# Patient Record
Sex: MALE | Race: WHITE | NOT HISPANIC OR LATINO
[De-identification: names, ages, dates, MRNs, and addresses within clinical notes are randomized per-mention and may not be internally consistent; named-entity substitution may affect disease eponyms.]

---
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
## 2019-05-10 ENCOUNTER — APPOINTMENT (OUTPATIENT)
Dept: FAMILY MEDICINE | Facility: CLINIC | Age: 19
End: 2019-05-10
Payer: COMMERCIAL

## 2019-05-10 VITALS
DIASTOLIC BLOOD PRESSURE: 70 MMHG | SYSTOLIC BLOOD PRESSURE: 115 MMHG | TEMPERATURE: 97.7 F | RESPIRATION RATE: 20 BRPM | HEART RATE: 64 BPM

## 2019-05-10 PROCEDURE — 99213 OFFICE O/P EST LOW 20 MIN: CPT

## 2019-05-10 NOTE — HISTORY OF PRESENT ILLNESS
[FreeTextEntry8] : Presents on acute basis - states has had severe sore throat for the past 4 days; not improving.  States congested; denies fever.

## 2019-05-10 NOTE — PHYSICAL EXAM
[Ill-Appearing] : ill-appearing [Normal Outer Ear/Nose] : the outer ears and nose were normal in appearance [Normal Nasal Mucosa] : the nasal mucosa was normal [Supple] : supple [No Accessory Muscle Use] : no accessory muscle use [No Respiratory Distress] : no respiratory distress  [Clear to Auscultation] : lungs were clear to auscultation bilaterally [Normal Rate] : normal rate  [Regular Rhythm] : with a regular rhythm [Normal S1, S2] : normal S1 and S2 [No Murmur] : no murmur heard [Soft] : abdomen soft [Non Tender] : non-tender [de-identified] : TMs and pharynx congested; pharynx markedly injected with tonsils erythematous, mildly indurated [Cervical Lymph Nodes Enlarged Anterior Bilaterally] : enlarged nodes bilaterally

## 2019-05-10 NOTE — ASSESSMENT
[FreeTextEntry1] : Tonsillitis - will order Doxycycline 100mg twice daily for 7 days; encourage fluids.

## 2019-05-31 ENCOUNTER — APPOINTMENT (OUTPATIENT)
Dept: FAMILY MEDICINE | Facility: CLINIC | Age: 19
End: 2019-05-31
Payer: COMMERCIAL

## 2019-05-31 VITALS
WEIGHT: 187 LBS | BODY MASS INDEX: 24.78 KG/M2 | HEIGHT: 73 IN | SYSTOLIC BLOOD PRESSURE: 118 MMHG | HEART RATE: 58 BPM | DIASTOLIC BLOOD PRESSURE: 70 MMHG | RESPIRATION RATE: 20 BRPM

## 2019-05-31 PROCEDURE — 99395 PREV VISIT EST AGE 18-39: CPT | Mod: 25

## 2019-05-31 PROCEDURE — 93000 ELECTROCARDIOGRAM COMPLETE: CPT | Mod: 59

## 2019-05-31 PROCEDURE — 36415 COLL VENOUS BLD VENIPUNCTURE: CPT

## 2019-05-31 NOTE — HISTORY OF PRESENT ILLNESS
[FreeTextEntry1] : Requests comprehensive exam [de-identified] : Presents for comprehensive exam.  States feeling well, exercising regularly.  States recovered from recent tonsillitis (see previous note).\par Denies breathing issues.

## 2019-05-31 NOTE — PHYSICAL EXAM
[No Acute Distress] : no acute distress [Well Nourished] : well nourished [Well Developed] : well developed [Well-Appearing] : well-appearing [Normal Sclera/Conjunctiva] : normal sclera/conjunctiva [PERRL] : pupils equal round and reactive to light [EOMI] : extraocular movements intact [Normal Outer Ear/Nose] : the outer ears and nose were normal in appearance [Normal Oropharynx] : the oropharynx was normal [Normal TMs] : both tympanic membranes were normal [Normal Nasal Mucosa] : the nasal mucosa was normal [No JVD] : no jugular venous distention [Supple] : supple [No Lymphadenopathy] : no lymphadenopathy [Thyroid Normal, No Nodules] : the thyroid was normal and there were no nodules present [No Respiratory Distress] : no respiratory distress  [Clear to Auscultation] : lungs were clear to auscultation bilaterally [No Accessory Muscle Use] : no accessory muscle use [Normal Rate] : normal rate  [Regular Rhythm] : with a regular rhythm [Normal S1, S2] : normal S1 and S2 [No Murmur] : no murmur heard [No Carotid Bruits] : no carotid bruits [No Abdominal Bruit] : a ~M bruit was not heard ~T in the abdomen [No Varicosities] : no varicosities [Pedal Pulses Present] : the pedal pulses are present [No Edema] : there was no peripheral edema [No Extremity Clubbing/Cyanosis] : no extremity clubbing/cyanosis [No Palpable Aorta] : no palpable aorta [Soft] : abdomen soft [Non Tender] : non-tender [Non-distended] : non-distended [No Masses] : no abdominal mass palpated [No HSM] : no HSM [Normal Bowel Sounds] : normal bowel sounds [No Hernias] : no hernias [Penis Abnormality] : normal circumcised penis [Testes Tenderness] : no tenderness of the testes [Testes Mass (___cm)] : there were no testicular masses [Normal Posterior Cervical Nodes] : no posterior cervical lymphadenopathy [Normal Femoral Nodes] : no femoral lymphadenopathy [Normal Anterior Cervical Nodes] : no anterior cervical lymphadenopathy [Normal Inguinal Nodes] : no inguinal lymphadenopathy [No CVA Tenderness] : no CVA  tenderness [No Spinal Tenderness] : no spinal tenderness [No Joint Swelling] : no joint swelling [Grossly Normal Strength/Tone] : grossly normal strength/tone [No Rash] : no rash [No Skin Lesions] : no skin lesions [Normal Gait] : normal gait [Coordination Grossly Intact] : coordination grossly intact [No Focal Deficits] : no focal deficits [Speech Grossly Normal] : speech grossly normal [Memory Grossly Normal] : memory grossly normal [Normal Affect] : the affect was normal [Alert and Oriented x3] : oriented to person, place, and time [Normal Mood] : the mood was normal [Normal Insight/Judgement] : insight and judgment were intact [FreeTextEntry1] : tonsils large, somewhat cryptic, without evidence of injection or exudate

## 2019-05-31 NOTE — ASSESSMENT
[FreeTextEntry1] : Comprehensive exam reveals patient to be hemodynamically stable with acceptable BP\par Asthma quiescent at present\par Lab profiles sent

## 2019-06-01 LAB
ALBUMIN SERPL ELPH-MCNC: 4.3 G/DL
ALP BLD-CCNC: 49 U/L
ALT SERPL-CCNC: 22 U/L
ANION GAP SERPL CALC-SCNC: 12 MMOL/L
APPEARANCE: CLEAR
AST SERPL-CCNC: 21 U/L
BASOPHILS # BLD AUTO: 0.04 K/UL
BASOPHILS NFR BLD AUTO: 0.6 %
BILIRUB SERPL-MCNC: 0.8 MG/DL
BILIRUBIN URINE: NEGATIVE
BLOOD URINE: NEGATIVE
BUN SERPL-MCNC: 13 MG/DL
CALCIUM SERPL-MCNC: 9.4 MG/DL
CHLORIDE SERPL-SCNC: 105 MMOL/L
CHOLEST SERPL-MCNC: 159 MG/DL
CHOLEST/HDLC SERPL: 3.8 RATIO
CO2 SERPL-SCNC: 25 MMOL/L
COLOR: YELLOW
CREAT SERPL-MCNC: 1.12 MG/DL
EOSINOPHIL # BLD AUTO: 0.14 K/UL
EOSINOPHIL NFR BLD AUTO: 2.2 %
ESTIMATED AVERAGE GLUCOSE: 105 MG/DL
GLUCOSE QUALITATIVE U: NEGATIVE
GLUCOSE SERPL-MCNC: 92 MG/DL
HBA1C MFR BLD HPLC: 5.3 %
HCT VFR BLD CALC: 45.9 %
HDLC SERPL-MCNC: 42 MG/DL
HGB BLD-MCNC: 15.1 G/DL
IMM GRANULOCYTES NFR BLD AUTO: 0.3 %
KETONES URINE: NEGATIVE
LDLC SERPL CALC-MCNC: 106 MG/DL
LEUKOCYTE ESTERASE URINE: NEGATIVE
LYMPHOCYTES # BLD AUTO: 3.17 K/UL
LYMPHOCYTES NFR BLD AUTO: 49.5 %
MAN DIFF?: NORMAL
MCHC RBC-ENTMCNC: 30 PG
MCHC RBC-ENTMCNC: 32.9 GM/DL
MCV RBC AUTO: 91.1 FL
MONOCYTES # BLD AUTO: 0.36 K/UL
MONOCYTES NFR BLD AUTO: 5.6 %
NEUTROPHILS # BLD AUTO: 2.67 K/UL
NEUTROPHILS NFR BLD AUTO: 41.8 %
NITRITE URINE: NEGATIVE
PH URINE: 6
PLATELET # BLD AUTO: 309 K/UL
POTASSIUM SERPL-SCNC: 4.2 MMOL/L
PROT SERPL-MCNC: 6.7 G/DL
PROTEIN URINE: NEGATIVE
RBC # BLD: 5.04 M/UL
RBC # FLD: 13.1 %
SODIUM SERPL-SCNC: 142 MMOL/L
SPECIFIC GRAVITY URINE: 1.03
TRIGL SERPL-MCNC: 57 MG/DL
UROBILINOGEN URINE: NORMAL
WBC # FLD AUTO: 6.4 K/UL

## 2019-10-18 ENCOUNTER — TRANSCRIPTION ENCOUNTER (OUTPATIENT)
Age: 19
End: 2019-10-18

## 2019-10-18 ENCOUNTER — EMERGENCY (EMERGENCY)
Facility: HOSPITAL | Age: 19
LOS: 1 days | Discharge: HOME | End: 2019-10-18
Attending: EMERGENCY MEDICINE | Admitting: EMERGENCY MEDICINE
Payer: COMMERCIAL

## 2019-10-18 VITALS
SYSTOLIC BLOOD PRESSURE: 116 MMHG | TEMPERATURE: 99 F | RESPIRATION RATE: 18 BRPM | HEART RATE: 82 BPM | OXYGEN SATURATION: 99 % | DIASTOLIC BLOOD PRESSURE: 58 MMHG

## 2019-10-18 VITALS
HEART RATE: 93 BPM | RESPIRATION RATE: 17 BRPM | TEMPERATURE: 99 F | DIASTOLIC BLOOD PRESSURE: 70 MMHG | WEIGHT: 179.9 LBS | HEIGHT: 74 IN | OXYGEN SATURATION: 99 % | SYSTOLIC BLOOD PRESSURE: 146 MMHG

## 2019-10-18 PROCEDURE — 99284 EMERGENCY DEPT VISIT MOD MDM: CPT

## 2019-10-18 PROCEDURE — 93005 ELECTROCARDIOGRAM TRACING: CPT

## 2019-10-18 PROCEDURE — 99284 EMERGENCY DEPT VISIT MOD MDM: CPT | Mod: 25

## 2019-10-18 PROCEDURE — 93010 ELECTROCARDIOGRAM REPORT: CPT

## 2019-10-18 PROCEDURE — 96365 THER/PROPH/DIAG IV INF INIT: CPT

## 2019-10-18 RX ORDER — KETOROLAC TROMETHAMINE 30 MG/ML
30 SYRINGE (ML) INJECTION ONCE
Refills: 0 | Status: DISCONTINUED | OUTPATIENT
Start: 2019-10-18 | End: 2019-10-18

## 2019-10-18 RX ORDER — CEFTRIAXONE 500 MG/1
1000 INJECTION, POWDER, FOR SOLUTION INTRAMUSCULAR; INTRAVENOUS ONCE
Refills: 0 | Status: COMPLETED | OUTPATIENT
Start: 2019-10-18 | End: 2019-10-18

## 2019-10-18 RX ORDER — CEFUROXIME AXETIL 250 MG
1 TABLET ORAL
Qty: 18 | Refills: 0
Start: 2019-10-18 | End: 2019-10-26

## 2019-10-18 RX ADMIN — Medication 30 MILLIGRAM(S): at 22:06

## 2019-10-18 RX ADMIN — CEFTRIAXONE 100 MILLIGRAM(S): 500 INJECTION, POWDER, FOR SOLUTION INTRAMUSCULAR; INTRAVENOUS at 21:50

## 2019-10-18 RX ADMIN — Medication 30 MILLIGRAM(S): at 21:51

## 2019-10-18 RX ADMIN — CEFTRIAXONE 1000 MILLIGRAM(S): 500 INJECTION, POWDER, FOR SOLUTION INTRAMUSCULAR; INTRAVENOUS at 22:20

## 2019-10-18 NOTE — ED PROVIDER NOTE - NSFOLLOWUPINSTRUCTIONS_ED_ALL_ED_FT
return not able to swallow, motrin 400mg every 6 hours, warm rinses to mouth start antibiotics tomorrow

## 2019-10-18 NOTE — ED PROVIDER NOTE - CLINICAL SUMMARY MEDICAL DECISION MAKING FREE TEXT BOX
pt with tonsilitis difficulty swallowing no trismus received im decadron in urgent care and had a near syncopal event pt is feeling better after iv fluids and toradol ekg NSR

## 2019-10-18 NOTE — ED PROVIDER NOTE - OBJECTIVE STATEMENT
19 year old M with a sore throat and fever for two days. Pt was seen at urgent care and after getting a shot of decadron had near syncope. Pt denies chest pain or hurting himself. Pt has complaints of difficulty swallowing for four days. Pt reportedly had a neg mono.

## 2019-10-18 NOTE — ED PROVIDER NOTE - CARE PLAN
Principal Discharge DX:	Tonsillitis Principal Discharge DX:	Tonsillitis  Secondary Diagnosis:	Syncope

## 2019-10-18 NOTE — ED ADULT TRIAGE NOTE - CHIEF COMPLAINT QUOTE
Pt presents to the ED with complaints of throat pain, pt went to an urgent care and had IM Decadron and had a vasovagal reaction so urgent care called an ambulance for the pt. EMS inserted IV to L AC with NS infusing at this time. Pt has been sick x1 week and tonsils are swollen.

## 2019-10-18 NOTE — ED ADULT NURSE NOTE - OBJECTIVE STATEMENT
Pt presents to the ED with complaints of sore throat, pt went to urgent care due to throat pain and was dx with tonsilitis. Pt was given 10mg of Decadron IM at urgent care and then had a vasovagal episode so they called EMS. Pt states he has been sick for a week and has had said throat pain. Pt has no pertinent past medical history, airway patent but pt complains of increased pain swallowing and thus  states he cannot swallow due to pain. EMS inserted IV with NS infusing upon arrival. Pt denies SOB or chest pain.

## 2019-10-18 NOTE — ED PROVIDER NOTE - PATIENT PORTAL LINK FT
You can access the FollowMyHealth Patient Portal offered by [facility] by registering at the following website: [email or web address]. By joining [employer]’s FollowMyHealth portal, you will also be able to view your health information using other applications (apps) compatible with our system.

## 2019-11-27 ENCOUNTER — APPOINTMENT (OUTPATIENT)
Dept: FAMILY MEDICINE | Facility: CLINIC | Age: 19
End: 2019-11-27
Payer: COMMERCIAL

## 2019-11-27 VITALS — RESPIRATION RATE: 20 BRPM | SYSTOLIC BLOOD PRESSURE: 120 MMHG | DIASTOLIC BLOOD PRESSURE: 70 MMHG | HEART RATE: 64 BPM

## 2019-11-27 DIAGNOSIS — J35.8 OTHER CHRONIC DISEASES OF TONSILS AND ADENOIDS: ICD-10-CM

## 2019-11-27 PROCEDURE — 99213 OFFICE O/P EST LOW 20 MIN: CPT

## 2019-11-27 NOTE — HISTORY OF PRESENT ILLNESS
[FreeTextEntry8] : Presents on acute basis over concern about "tonsils."  States tonsils are enlarged and have been a source of discomfort; has gotten significant tonsillitis on multiple occasions and is questioning whether he is a candidate for tonsillectomy.  States "feels fine" at present.

## 2019-11-27 NOTE — ASSESSMENT
[FreeTextEntry1] : Cryptic tonsils without evidence of acute infection at present - discussed the criteria for tonsillectomy, the fact that "adult tonsils" are somewhat more of a challenge; feel prudent to have pt see ENT - request for referral placed and staff to assist.

## 2019-11-27 NOTE — PHYSICAL EXAM
[No Acute Distress] : no acute distress [Normal Outer Ear/Nose] : the outer ears and nose were normal in appearance [Normal TMs] : both tympanic membranes were normal [Normal Nasal Mucosa] : the nasal mucosa was normal [Supple] : supple [Normal] : normal rate, regular rhythm, normal S1 and S2 and no murmur heard [Soft] : abdomen soft [Non Tender] : non-tender [Normal Posterior Cervical Nodes] : no posterior cervical lymphadenopathy [Normal Anterior Cervical Nodes] : no anterior cervical lymphadenopathy [de-identified] : mild congestion without injection; bilateral hypertrophic tonsils, somewhat cryptic; no exudate or injection noted

## 2020-03-07 ENCOUNTER — TRANSCRIPTION ENCOUNTER (OUTPATIENT)
Age: 20
End: 2020-03-07

## 2020-03-12 ENCOUNTER — TRANSCRIPTION ENCOUNTER (OUTPATIENT)
Age: 20
End: 2020-03-12

## 2020-03-30 ENCOUNTER — APPOINTMENT (OUTPATIENT)
Dept: FAMILY MEDICINE | Facility: CLINIC | Age: 20
End: 2020-03-30
Payer: COMMERCIAL

## 2020-03-30 VITALS
HEART RATE: 68 BPM | RESPIRATION RATE: 20 BRPM | TEMPERATURE: 97.8 F | DIASTOLIC BLOOD PRESSURE: 70 MMHG | SYSTOLIC BLOOD PRESSURE: 122 MMHG

## 2020-03-30 PROCEDURE — 99213 OFFICE O/P EST LOW 20 MIN: CPT

## 2020-03-30 NOTE — ASSESSMENT
[FreeTextEntry1] : Tonsillitis - will order Augmentin 500mg twice daily for 7 days (pt states has used this with good results from "Urgent Care" in the past); advise fluids, rest.

## 2020-03-30 NOTE — REVIEW OF SYSTEMS
[Fever] : no fever [Chills] : no chills [Earache] : no earache [Nasal Discharge] : no nasal discharge [Sore Throat] : sore throat [Negative] : Genitourinary

## 2020-03-30 NOTE — PHYSICAL EXAM
[No Acute Distress] : no acute distress [Normal Outer Ear/Nose] : the outer ears and nose were normal in appearance [Normal TMs] : both tympanic membranes were normal [Normal Nasal Mucosa] : the nasal mucosa was normal [Supple] : supple [Normal] : normal rate, regular rhythm, normal S1 and S2 and no murmur heard [Soft] : abdomen soft [Non Tender] : non-tender [Cervical Lymph Nodes Enlarged Anterior Bilaterally] : enlarged nodes bilaterally [Tender] : tender [de-identified] : tonsils markedly inflamed, erythematous

## 2020-03-30 NOTE — HISTORY OF PRESENT ILLNESS
[FreeTextEntry8] : Patient requested an acute visit - awoke this morning with "swollen tonsils;" states painful; denies temp or other symptoms.  Note this has been a recurrent issue.

## 2020-06-01 ENCOUNTER — APPOINTMENT (OUTPATIENT)
Dept: FAMILY MEDICINE | Facility: CLINIC | Age: 20
End: 2020-06-01
Payer: COMMERCIAL

## 2020-06-01 VITALS
SYSTOLIC BLOOD PRESSURE: 114 MMHG | HEIGHT: 73 IN | WEIGHT: 213 LBS | BODY MASS INDEX: 28.23 KG/M2 | DIASTOLIC BLOOD PRESSURE: 70 MMHG | HEART RATE: 60 BPM | RESPIRATION RATE: 20 BRPM

## 2020-06-01 PROCEDURE — 99395 PREV VISIT EST AGE 18-39: CPT | Mod: 25

## 2020-06-01 PROCEDURE — 93000 ELECTROCARDIOGRAM COMPLETE: CPT

## 2020-06-01 PROCEDURE — 36415 COLL VENOUS BLD VENIPUNCTURE: CPT

## 2020-06-01 NOTE — ASSESSMENT
[FreeTextEntry1] : Comprehensive exam reveals patient to be hemodynamically stable with acceptable BP\par Asthma quiescent at present\par Tonsil findings consistent with history - await tonsillectomy\par Lab profiles drawn in office and sent none

## 2020-06-01 NOTE — HISTORY OF PRESENT ILLNESS
[FreeTextEntry1] : Requests comprehensive exam [de-identified] : Presents for comprehensive exam.  States feeling well; trying to exercise as much as possible since the gyms have been closed.  Denies new breathing issues. Reviewed screening and immunizations - offered HIV screening per guidelines - pt is amenable.\par Also needs forms for college transfer (see scanned documents).\par Also note scheduled for tonsillectomy in one week ([place]).

## 2020-06-01 NOTE — PHYSICAL EXAM
[Normal TMs] : both tympanic membranes were normal [Normal Outer Ear/Nose] : the outer ears and nose were normal in appearance [Normal Nasal Mucosa] : the nasal mucosa was normal [Normal Posterior Cervical Nodes] : no posterior cervical lymphadenopathy [Normal Anterior Cervical Nodes] : no anterior cervical lymphadenopathy [Normal Inguinal Nodes] : no inguinal lymphadenopathy [Normal Femoral Nodes] : no femoral lymphadenopathy [Coordination Grossly Intact] : coordination grossly intact [Normal] : no joint swelling and grossly normal strength and tone [Normal Gait] : normal gait [Speech Grossly Normal] : speech grossly normal [No Focal Deficits] : no focal deficits [Alert and Oriented x3] : oriented to person, place, and time [Normal Affect] : the affect was normal [Memory Grossly Normal] : memory grossly normal [Normal Insight/Judgement] : insight and judgment were intact [Normal Mood] : the mood was normal [de-identified] : cryptic tonsils noted

## 2020-06-01 NOTE — HEALTH RISK ASSESSMENT
[Yes] : Yes [2 - 4 times a month (2 pts)] : 2-4 times a month (2 points) [1 or 2 (0 pts)] : 1 or 2 (0 points) [Never (0 pts)] : Never (0 points) [No] : In the past 12 months have you used drugs other than those required for medical reasons? No [0] : 2) Feeling down, depressed, or hopeless: Not at all (0) [] : No [Audit-CScore] : 2

## 2020-06-01 NOTE — COUNSELING
[de-identified] : Healthy eating and activities [None] : None [Good understanding] : Patient has a good understanding of lifestyle changes and steps needed to achieve self management goal

## 2020-06-02 LAB
ALBUMIN SERPL ELPH-MCNC: 4.9 G/DL
ALP BLD-CCNC: 45 U/L
ALT SERPL-CCNC: 21 U/L
ANION GAP SERPL CALC-SCNC: 13 MMOL/L
APPEARANCE: CLEAR
AST SERPL-CCNC: 17 U/L
BASOPHILS # BLD AUTO: 0.05 K/UL
BASOPHILS NFR BLD AUTO: 0.7 %
BILIRUB SERPL-MCNC: 0.7 MG/DL
BILIRUBIN URINE: NEGATIVE
BLOOD URINE: NEGATIVE
BUN SERPL-MCNC: 14 MG/DL
CALCIUM SERPL-MCNC: 10.4 MG/DL
CHLORIDE SERPL-SCNC: 103 MMOL/L
CHOLEST SERPL-MCNC: 217 MG/DL
CHOLEST/HDLC SERPL: 4.7 RATIO
CO2 SERPL-SCNC: 24 MMOL/L
COLOR: YELLOW
CREAT SERPL-MCNC: 1.11 MG/DL
EOSINOPHIL # BLD AUTO: 0.11 K/UL
EOSINOPHIL NFR BLD AUTO: 1.6 %
GLUCOSE QUALITATIVE U: NEGATIVE
GLUCOSE SERPL-MCNC: 90 MG/DL
HCT VFR BLD CALC: 47.2 %
HDLC SERPL-MCNC: 46 MG/DL
HGB BLD-MCNC: 15.8 G/DL
HIV1+2 AB SPEC QL IA.RAPID: NONREACTIVE
IMM GRANULOCYTES NFR BLD AUTO: 0.1 %
KETONES URINE: NEGATIVE
LDLC SERPL CALC-MCNC: 140 MG/DL
LEUKOCYTE ESTERASE URINE: NEGATIVE
LYMPHOCYTES # BLD AUTO: 2.99 K/UL
LYMPHOCYTES NFR BLD AUTO: 44.2 %
MAN DIFF?: NORMAL
MCHC RBC-ENTMCNC: 29.8 PG
MCHC RBC-ENTMCNC: 33.5 GM/DL
MCV RBC AUTO: 88.9 FL
MONOCYTES # BLD AUTO: 0.46 K/UL
MONOCYTES NFR BLD AUTO: 6.8 %
NEUTROPHILS # BLD AUTO: 3.14 K/UL
NEUTROPHILS NFR BLD AUTO: 46.6 %
NITRITE URINE: NEGATIVE
PH URINE: 5.5
PLATELET # BLD AUTO: 295 K/UL
POTASSIUM SERPL-SCNC: 4.1 MMOL/L
PROT SERPL-MCNC: 7 G/DL
PROTEIN URINE: NEGATIVE
RBC # BLD: 5.31 M/UL
RBC # FLD: 12.7 %
SODIUM SERPL-SCNC: 140 MMOL/L
SPECIFIC GRAVITY URINE: 1.02
TRIGL SERPL-MCNC: 153 MG/DL
UROBILINOGEN URINE: NORMAL
WBC # FLD AUTO: 6.76 K/UL

## 2020-07-18 ENCOUNTER — TRANSCRIPTION ENCOUNTER (OUTPATIENT)
Age: 20
End: 2020-07-18

## 2020-08-21 NOTE — ED ADULT TRIAGE NOTE - HEIGHT IN INCHES
Physician Documentation                                                                           

 [facility]                                                                 

Name: Brandon Chaudhari                                                                              

Age: 29 yrs                                                                                       

Sex: Male                                                                                         

: 1990                                                                                   

MRN: [id number]                                                                                   

Arrival Date: 2020                                                                          

Time: 20:31                                                                                       

Account#: [id number]                                                                            

Bed 5                                                                                             

Private MD:                                                                                       

ED Physician Denver Jeffersno                                                                      

HPI:                                                                                              

                                                                                             

21:28 This 29 yrs old Male presents to ER via Ambulatory with complaints of Numbness,         mh7 

      Abdominal Pain, Chest Pressure.                                                             

21:28 The patient or guardian reports chest pain that is located primarily in the epigastric  mh7 

      area.                                                                                       

21:30 The patient or guardian reports chest pain that is located primarily in the anterior    mh7 

      chest wall, bilaterally. The pain does not radiate.                                         

21:32 Associated signs and symptoms: Pertinent positives: abdominal pain, palpitations,       mh7 

      numbness/tingling of hands, Pertinent negatives: cough, diaphoresis, dizziness,             

      headache, lower extremity pain, lower extremity swelling, lightheadedness, nausea, near     

      syncope, recent travel, shortness of breath, syncope, vomiting. The chest pain is           

      described as a pressure. Duration: The patient or guardian reports multiple episodes,       

      that are intermittent, that wax and wane, with no pattern. Modifying factors: The           

      symptoms are alleviated by nothing. the symptoms are aggravated by emotionally              

      stressful situations. Severity of pain: At its worst the pain was moderate today, in        

      the emergency department the pain has improved markedly. The patient has experienced        

      similar episodes in the past, multiple times. Patient reports a history of anxiety as a     

      child. He states similar symptoms reoccurred starting about 6 weeks ago intermittently.     

      He reports having two episodes today. Symptoms include chest pressure,                      

      numbness/tingling of hands, upper abdominal cramping..                                      

                                                                                                  

Historical:                                                                                       

- Allergies:                                                                                      

20:49 Morphine;                                                                               jd3 

- Home Meds:                                                                                      

20:49 None [Active];                                                                          jd3 

- PMHx:                                                                                           

20:49 Anxiety; PVC's; ADD/ADHD;                                                               jd3 

- PSHx:                                                                                           

20:49 FELICE hand; left ankle;                                                                   jd3 

                                                                                                  

- Immunization history:: Adult Immunizations up to date.                                          

- Social history:: Smoking status: Patient/guardian denies using tobacco, Stopped _               

  months ago 1.                                                                                   

                                                                                                  

                                                                                                  

ROS:                                                                                              

21:32 Constitutional: Negative for fever, chills, and weight loss, Eyes: Negative for injury, mh7 

      pain, redness, and discharge, ENT: Negative for injury, pain, and discharge, Neck:          

      Negative for injury, pain, and swelling, Respiratory: Negative for shortness of breath,     

      cough, wheezing, and pleuritic chest pain, Back: Negative for injury and pain, :          

      Negative for injury, bleeding, discharge, and swelling, Skin: Negative for injury,          

      rash, and discoloration, Allergy/Immunology: Negative for hives, rash, and allergies,       

      Endocrine: Negative for neck swelling, polydipsia, polyuria, polyphagia, and marked         

      weight changes, Hematologic/Lymphatic: Negative for swollen nodes, abnormal bleeding,       

      and unusual bruising.                                                                       

                                                                                                  

Exam:                                                                                             

21:32 Head/Face:  Normocephalic, atraumatic. Eyes:  Pupils equal round and reactive to light, mh7 

      extra-ocular motions intact.  Lids and lashes normal.  Conjunctiva and sclera are           

      non-icteric and not injected.  Cornea within normal limits.  Periorbital areas with no      

      swelling, redness, or edema. ENT:  Nares patent. No nasal discharge, no septal              

      abnormalities noted.  Tympanic membranes are normal and external auditory canals are        

      clear.  Oropharynx with no redness, swelling, or masses, exudates, or evidence of           

      obstruction, uvula midline.  Mucous membranes moist. Neck:  Trachea midline, no             

      thyromegaly or masses palpated, and no cervical lymphadenopathy.  Supple, full range of     

      motion without nuchal rigidity, or vertebral point tenderness.  No Meningismus.             

      Chest/axilla:  Normal chest wall appearance and motion.  Nontender with no deformity.       

      No lesions are appreciated. Cardiovascular:  Regular rate and rhythm with a normal S1       

      and S2.  No gallops, murmurs, or rubs.  Normal PMI, no JVD.  No pulse deficits.             

      Respiratory:  Lungs have equal breath sounds bilaterally, clear to auscultation and         

      percussion.  No rales, rhonchi or wheezes noted.  No increased work of breathing, no        

      retractions or nasal flaring. Abdomen/GI:  Soft, non-tender, with normal bowel sounds.      

      No distension or tympany.  No guarding or rebound.  No evidence of tenderness               

      throughout. Back:  No spinal tenderness.  No costovertebral tenderness.  Full range of      

      motion. Skin:  Warm, dry with normal turgor.  Normal color with no rashes, no lesions,      

      and no evidence of cellulitis. MS/ Extremity:  Pulses equal, no cyanosis.                   

      Neurovascular intact.  Full, normal range of motion. Neuro:  Awake and alert, GCS 15,       

      oriented to person, place, time, and situation.  Cranial nerves II-XII grossly intact.      

      Motor strength 5/5 in all extremities.  Sensory grossly intact.  Cerebellar exam            

      normal.  Normal gait.                                                                       

21:32 Constitutional: The patient appears in no acute distress, alert, awake, comfortable,        

      anxious.                                                                                    

21:32 Psych: Behavior/mood is pleasant, cooperative, anxious, Affect is calm, Oriented to         

      person, place, time, Patient has no thoughts/intents to harm self or others. Judgement      

      / Insight is normal. Memory is normal. Delusions/hallucinations are not present.            

                                                                                                  

Vital Signs:                                                                                      

20:46  / 70; Pulse 55; Resp 19 S; Temp 98.6(O); Pulse Ox 100% on R/A; Weight 79.38 kg   jd3 

      (R); Height 6 ft. 1 in. (185.42 cm) (R); Pain 5/10;                                         

21:55  / 75; Pulse 52; Resp 17; Pulse Ox 98% ;                                          rr5 

22:30  / 63; Pulse 55; Resp 19; Pulse Ox 100% ;                                         rr5 

23:25  / 69; Pulse 53; Resp 14; Pulse Ox 98% ;                                          rr5 

23:52  / 70; Pulse 55; Resp 16; Pulse Ox 99% ;                                          rr5 

20:46 Body Mass Index 23.09 (79.38 kg, 185.42 cm)                                             jd3 

                                                                                                  

MDM:                                                                                              

20:54 Patient medically screened.                                                             [facility] 

23:38 Differential diagnosis: acute myocardial infarction, anxiety, coronary artery disease   [facility] 

      chest wall pain, costochondritis, peptic ulcer disease, pneumonia, pneumothorax. HEART      

      Score: History: Slightly Suspicious (0), ECG: Normal (0), Age: < or = 45 years (0),         

      Risk Factors: No Risk Factors Known (0), Troponin: < or = 1 x Normal Limit (0), Total       

      Score = 0. Data reviewed: vital signs, nurses notes, lab test result(s), cardiac            

      enzymes, CBC, electrolytes, urinalysis, urine drug screen, EKG, radiologic studies,         

      plain films. Data interpreted: Cardiac monitor: rate is 98 beats/min, rhythm is normal      

      sinus rhythm, regular, Pulse oximetry: on room air is 98 %. Interpretation: normal.         

      Counseling: I had a detailed discussion with the patient and/or guardian regarding: the     

      historical points, exam findings, and any diagnostic results supporting the                 

      discharge/admit diagnosis, lab results, radiology results, the need for outpatient          

      follow up, to return to the emergency department if symptoms worsen or persist or if        

      there are any questions or concerns that arise at home.                                     

                                                                                                  

                                                                                             

20:57 Order name: Basic Metabolic Panel; Complete Time: 22:04                                 7 

                                                                                             

20:57 Order name: CBC with Diff; Complete Time: 22:04                                         [facility] 

                                                                                             

20:57 Order name: LFT's; Complete Time: 22:04                                                 [facility] 

                                                                                             

20:57 Order name: Magnesium; Complete Time: 22:04                                             7 

                                                                                             

20:57 Order name: Troponin (emerg Dept Use Only); Complete Time: 22:04                        [facility] 

                                                                                             

20:57 Order name: Lipase; Complete Time: 22:04                                                7 

                                                                                             

20:57 Order name: XRAY Chest (1 view)                                                         [facility] 

                                                                                             

20:57 Order name: EKG; Complete Time: 20:57                                                   7 

                                                                                             

20:57 Order name: Cardiac monitoring; Complete Time: 20:58                                    7 

                                                                                             

20:57 Order name: EKG - Nurse/Tech; Complete Time: 21:12                                      7 

                                                                                             

20:57 Order name: IV Saline Lock; Complete Time: 21:12                                        7 

                                                                                             

20:57 Order name: Labs collected and sent; Complete Time: 21:12                               7 

                                                                                             

20:57 Order name: UDS; Complete Time: 22:04                                                   7 

                                                                                             

21:11 Order name: Urine Dipstick--Ancillary (enter results); Complete Time: 22:04             tt3 

                                                                                             

20:57 Order name: O2 Per Protocol; Complete Time: 20:58                                       7 

                                                                                             

20:57 Order name: O2 Sat Monitoring; Complete Time: 20:58                                     7 

                                                                                                  

Administered Medications:                                                                         

No medications were administered                                                                  

                                                                                                  

                                                                                                  

Disposition:                                                                                      

                                                                                             

07:22 Co-signature as Attending Physician, Denver Jefferson MD.                                 [facility] 

                                                                                                  

Disposition:                                                                                      

20 23:42 Discharged to Home. Impression: Chest pain, unspecified, Anxiety.                  

- Condition is Stable.                                                                            

- Discharge Instructions: Nonspecific Chest Pain, Easy-to-Read, Generalized Anxiety               

  Disorder.                                                                                       

                                                                                                  

- Medication Reconciliation Form, Thank You Letter, Antibiotic Education, Prescription            

  Opioid Use form.                                                                                

- Follow up: Private Physician; When: 2 - 3 days; Reason: Worsening of condition,                 

  Recheck today's complaints, Continuance of care, Re-evaluation by your physician.               

- Problem is an ongoing problem.                                                                  

- Symptoms have improved.                                                                         

                                                                                                  

                                                                                                  

                                                                                                  

Signatures:                                                                                       

Dispatcher [employer]                           EDJewel Nunez RN                    RN   jd3                                                  

Wander Beckham RN                      RN   rr5                                                  

Denver Jefferson MD MD   mh7                                                  

                                                                                                  

Corrections: (The following items were deleted from the chart)                                    

                                                                                             

23:55 23:42 2020 23:42 Discharged to Home. Impression: Chest pain, unspecified;         rr5 

      Anxiety. Condition is Stable. Forms are Medication Reconciliation Form, Thank You           

      Letter, Antibiotic Education, Prescription Opioid Use. Follow up: Private Physician;        

      When: 2 - 3 days; Reason: Worsening of condition, Recheck today's complaints,               

      Continuance of care, Re-evaluation by your physician. Problem is an ongoing problem.        

      Symptoms have improved. mh7                                                                 

                                                                                                  

**************************************************************************************************
2

## 2021-01-30 ENCOUNTER — EMERGENCY (EMERGENCY)
Facility: HOSPITAL | Age: 21
LOS: 1 days | Discharge: HOME | End: 2021-01-30
Attending: EMERGENCY MEDICINE | Admitting: EMERGENCY MEDICINE
Payer: COMMERCIAL

## 2021-01-30 VITALS
RESPIRATION RATE: 18 BRPM | HEIGHT: 74 IN | TEMPERATURE: 98 F | WEIGHT: 214.95 LBS | SYSTOLIC BLOOD PRESSURE: 132 MMHG | DIASTOLIC BLOOD PRESSURE: 79 MMHG | OXYGEN SATURATION: 98 % | HEART RATE: 101 BPM

## 2021-01-30 PROCEDURE — 23650 CLTX SHO DSLC W/MNPJ WO ANES: CPT | Mod: 54

## 2021-01-30 PROCEDURE — 99284 EMERGENCY DEPT VISIT MOD MDM: CPT | Mod: 57

## 2021-01-30 PROCEDURE — 73030 X-RAY EXAM OF SHOULDER: CPT | Mod: 26,RT

## 2021-01-30 NOTE — ED ADULT NURSE NOTE - NSFALLRSKASSESSDT_ED_ALL_ED
What Type Of Note Output Would You Prefer (Optional)?: Bullet Format
Hpi Title: Evaluation of Skin Lesions
How Severe Are Your Spot(S)?: moderate
Location: Right forearm
Year Removed: 2019
Additional History: Declines chaperone
30-Jan-2021 23:42

## 2021-01-31 VITALS
DIASTOLIC BLOOD PRESSURE: 82 MMHG | TEMPERATURE: 98 F | OXYGEN SATURATION: 89 % | RESPIRATION RATE: 17 BRPM | HEART RATE: 96 BPM | SYSTOLIC BLOOD PRESSURE: 127 MMHG

## 2021-01-31 PROCEDURE — 73030 X-RAY EXAM OF SHOULDER: CPT | Mod: 26,RT

## 2021-01-31 PROCEDURE — 99283 EMERGENCY DEPT VISIT LOW MDM: CPT | Mod: 25

## 2021-01-31 PROCEDURE — 73030 X-RAY EXAM OF SHOULDER: CPT

## 2021-01-31 PROCEDURE — 23650 CLTX SHO DSLC W/MNPJ WO ANES: CPT | Mod: RT

## 2021-01-31 RX ORDER — MORPHINE SULFATE 50 MG/1
4 CAPSULE, EXTENDED RELEASE ORAL ONCE
Refills: 0 | Status: DISCONTINUED | OUTPATIENT
Start: 2021-01-31 | End: 2021-01-31

## 2021-01-31 RX ORDER — ONDANSETRON 8 MG/1
4 TABLET, FILM COATED ORAL ONCE
Refills: 0 | Status: COMPLETED | OUTPATIENT
Start: 2021-01-31 | End: 2021-01-31

## 2021-01-31 RX ADMIN — MORPHINE SULFATE 4 MILLIGRAM(S): 50 CAPSULE, EXTENDED RELEASE ORAL at 00:13

## 2021-01-31 RX ADMIN — ONDANSETRON 4 MILLIGRAM(S): 8 TABLET, FILM COATED ORAL at 00:08

## 2021-01-31 NOTE — ED PROVIDER NOTE - OBJECTIVE STATEMENT
21 y/o male with no sig PMHx presents to ED c/o sudden onset right shoulder pain s/p injury while race ling. pain increased on moving right arm

## 2021-01-31 NOTE — ED PROVIDER NOTE - CLINICAL SUMMARY MEDICAL DECISION MAKING FREE TEXT BOX
pt p/w right shoulder pain after injury while race ling ant dislocation on xray, close reduction done with Davos technique,  post reduction Xray reviewed. joint successfully reduced

## 2021-01-31 NOTE — ED PROVIDER NOTE - PHYSICAL EXAMINATION
General:     NAD, well-nourished, well-appearing  Head:     NC/AT, EOMI, oral mucosa moist  Neck:     supple  Lungs:     CTA b/l, no w/r/r  CVS:     S1S2, RRR, no m/g/r  Abd:     +BS, s/nt/nd, no organomegaly  Ext:    2+ radial and pedal pulses, no c/c/e, right shoulder decreased ROM, distal NV intact  Neuro: grossly intact

## 2021-01-31 NOTE — ED PROVIDER NOTE - CARE PROVIDER_API CALL
Forrest Webster)  Orthopaedic Sports Medicine; Orthopaedic Surgery  [address] [address]  Phone: [phone]  Fax: [phone]  Follow Up Time:

## 2021-01-31 NOTE — ED PROVIDER NOTE - PATIENT PORTAL LINK FT
You can access the FollowMyHealth Patient Portal offered by [facility] by registering at the following website: [email or web address]. By joining [facility]’s FollowMyHealth portal, you will also be able to view your health information using other applications (apps) compatible with our system.

## 2021-02-05 ENCOUNTER — APPOINTMENT (OUTPATIENT)
Age: 21
End: 2021-02-05
Payer: COMMERCIAL

## 2021-02-05 VITALS — WEIGHT: 215 LBS | BODY MASS INDEX: 27.59 KG/M2 | HEIGHT: 74 IN

## 2021-02-05 VITALS — SYSTOLIC BLOOD PRESSURE: 118 MMHG | HEART RATE: 99 BPM | DIASTOLIC BLOOD PRESSURE: 78 MMHG

## 2021-02-05 PROCEDURE — 99203 OFFICE O/P NEW LOW 30 MIN: CPT

## 2021-02-05 PROCEDURE — 99072 ADDL SUPL MATRL&STAF TM PHE: CPT

## 2021-02-05 NOTE — DISCUSSION/SUMMARY
[de-identified] : I discussed the underlying pathophysiology of the patient's condition in great detail with the patient. I went over the patient's x-rays with them in great detail. We discussed the use of ice, Tylenol and anti-inflammatories to relieve pain. At this time, he should avoid any heavy lifting, carrying, or overhead activities. He should avoid moving his arm into abduction and external rotation to prevent another dislocation. At this time, he will start a course of physical therapy for strengthening and flexibility. A prescription for physical therapy was provided. I informed him that shoulder stabilization surgery is considered after 3 dislocations. All of his questions were answered. He understands and consents to the plan.\par \par FU 1 month.\par after undergoing PT for his right shoulder.

## 2021-02-05 NOTE — ADDENDUM
[FreeTextEntry1] : I, Rene Peterson, acted solely as a scribe for Dr. Devon Hoang on this date 02/05/2021.\par All medical record entries made by the Scribe were at my, Dr. Devon Hoang, direction and personally dictated by me on 02/05/2021. I have reviewed the chart and agree that the record accurately reflects my personal performance of the history, physical exam, assessment and plan. I have also personally directed, reviewed, and agreed with the chart.

## 2021-02-05 NOTE — PHYSICAL EXAM
[de-identified] : Constitutional\par o Appearance : well-nourished, well developed, alert, in no acute distress \par Head and Face\par o Head :\par ¦ Inspection : atraumatic, normocephalic\par o Face :\par ¦ Inspection : no visible rash or discoloration\par Respiratory\par o Respiratory Effort: breathing unlabored \par Neurologic\par o Sensation : Normal sensation \par Psychiatric\par o Mood and Affect: mood normal, affect appropriate \par Lymphatic\par o Additional Nodes : No palpable lymph nodes present \par \par Right Upper Extremity\par o Right Shoulder :\par ¦ Inspection/Palpation : mild anterior capsular tenderness, no AC joint tenderness, no swelling or deformities, normal sensation in the axillary patch\par ¦ Range of Motion : full and painless in all planes, no crepitance, mild apprehension with abduction and external rotation\par ¦ Strength :  forward elevation, internal rotation, external rotation, external rotation at 90° of abduction, supraspinatus, adduction and abduction, biceps/triceps, 5/5, excellent  strength\par ¦ Stability : no joint instability on provocative testing \par Tests: Box negative, Neer test negative, Justin test negative, drop arm test negative\par \par Gait and Station:\par Gait: gait normal, no significant extremity swelling or lymphedema, good proprioception and balance

## 2021-02-05 NOTE — HISTORY OF PRESENT ILLNESS
[de-identified] : 20 year old RHD male presents complaining of right shoulder pain. It started on 1/30/2021 when he fell onto his right hand with his arm outstretched. He went to [facility] and was found to have a dislocated shoulder. It was reduced in the hospital a few hours later and he was placed in a sling. He denies any numbness and tingling into his hand before or after the reduction. He denies any previous shoulder dislocations.\par \par X-rays of the RIGHT shoulder obtained at [facility] on 1/30/2021 revealed:\par o AP and attempted Y views revealed an anterior inferior dislocation of the right shoulder. Post-reduction views showed a reduced shoulder without any evident fracture.

## 2021-02-05 NOTE — CONSULT LETTER
[Dear  ___] : Dear  [unfilled], [Consult Letter:] : I had the pleasure of evaluating your patient, [unfilled]. [Please see my note below.] : Please see my note below. [Consult Closing:] : Thank you very much for allowing me to participate in the care of this patient.  If you have any questions, please do not hesitate to contact me. [Sincerely,] : Sincerely, [FreeTextEntry3] : Devon Hoang M.D.

## 2021-03-05 ENCOUNTER — APPOINTMENT (OUTPATIENT)
Dept: ORTHOPEDIC SURGERY | Facility: CLINIC | Age: 21
End: 2021-03-05
Payer: COMMERCIAL

## 2021-03-05 PROCEDURE — 99072 ADDL SUPL MATRL&STAF TM PHE: CPT

## 2021-03-05 PROCEDURE — 99213 OFFICE O/P EST LOW 20 MIN: CPT

## 2021-03-05 NOTE — ADDENDUM
[FreeTextEntry1] : I, Rene Peterson, acted solely as a scribe for Dr. Devon Hoang on this date 03/05/2021.\par All medical record entries made by the Scribe were at my, Dr. Devon Hoang, direction and personally dictated by me on 03/05/2021. I have reviewed the chart and agree that the record accurately reflects my personal performance of the history, physical exam, assessment and plan. I have also personally directed, reviewed, and agreed with the chart.

## 2021-03-05 NOTE — DISCUSSION/SUMMARY
[de-identified] : I went over the pathophysiology of the patient's symptoms in great detail with the patient. At-home strengthening, and stretching exercises were discussed and demonstrated with the patient. He should focus on light weight and high repetition exercises. He was advised to avoid positions involving abduction and external rotation to prevent another dislocation. I am recommending the patient continue going to physical therapy to obtain increases in their strength and mobility. A prescription for PT was provided. All of his questions were answered. He understands and consents to the plan. \par \par FU 6 weeks.\par after continuing PT for his right shoulder.

## 2021-03-05 NOTE — HISTORY OF PRESENT ILLNESS
[de-identified] : 21 year old RHD male presents with right shoulder pain. It started on 1/30/2021 when he fell onto his right hand with his arm outstretched. He went to [facility] and was found to have a dislocated shoulder. It was reduced in the hospital a few hours later. He denies any numbness and tingling into his hand before or after the reduction. He denies any previous shoulder dislocations. He has been attending PT for his shoulder and notes significant improvements in his strength and ROM. He denies pain when sleeping at night. He notes that he lifts weights every day.\par \par X-rays of the RIGHT shoulder obtained at [facility] on 1/30/2021 revealed:\par o AP and attempted Y views revealed an anterior inferior dislocation of the right shoulder. Post-reduction views showed a reduced shoulder without any evident fracture.

## 2021-03-05 NOTE — PHYSICAL EXAM
[de-identified] : Constitutional\par o Appearance : well-nourished, well developed, alert, in no acute distress \par Head and Face\par o Head :\par ¦ Inspection : atraumatic, normocephalic\par o Face :\par ¦ Inspection : no visible rash or discoloration\par Respiratory\par o Respiratory Effort: breathing unlabored \par Neurologic\par o Sensation : Normal sensation \par Psychiatric\par o Mood and Affect: mood normal, affect appropriate \par Lymphatic\par o Additional Nodes : No palpable lymph nodes present \par \par Right Upper Extremity\par o Right Shoulder :\par ¦ Inspection/Palpation : mild anterior capsular tenderness to palpation, no swelling or deformities, normal sensation in the axillary patch\par ¦ Range of Motion : full and painless in all planes, no crepitance\par ¦ Strength :  forward elevation, internal rotation, external rotation, external rotation at 90° of abduction, supraspinatus, adduction and abduction, 4+/5 in all arcs, biceps/triceps 5/5, excellent  strength\par ¦ Stability : no joint instability on provocative testing, no anterior, posterior or inferior instability\par ¦ Tests: Box negative, Neer test negative, Justin test negative, drop arm test negative, mildly positive apprehension test\par \par Gait and Station:\par Gait: gait normal, no significant extremity swelling or lymphedema, good proprioception and balance

## 2021-04-16 ENCOUNTER — APPOINTMENT (OUTPATIENT)
Dept: ORTHOPEDIC SURGERY | Facility: CLINIC | Age: 21
End: 2021-04-16
Payer: COMMERCIAL

## 2021-04-16 PROCEDURE — 99072 ADDL SUPL MATRL&STAF TM PHE: CPT

## 2021-04-16 PROCEDURE — 99213 OFFICE O/P EST LOW 20 MIN: CPT

## 2021-04-16 NOTE — PHYSICAL EXAM
[de-identified] : Constitutional\par o Appearance : well-nourished, well developed, alert, in no acute distress \par Head and Face\par o Head :\par ¦ Inspection : atraumatic, normocephalic\par o Face :\par ¦ Inspection : no visible rash or discoloration\par Respiratory\par o Respiratory Effort: breathing unlabored \par Neurologic\par o Sensation : Normal sensation \par Psychiatric\par o Mood and Affect: mood normal, affect appropriate \par Lymphatic\par o Additional Nodes : No palpable lymph nodes present \par \par Right Upper Extremity\par o Right Shoulder :\par ¦ Inspection/Palpation : no anterior capsular tenderness to palpation, no swelling or deformities, normal sensation in the axillary patch\par ¦ Range of Motion : full and painless in all planes, no crepitance, no pain with cross chest maneuvers\par ¦ Strength :  forward elevation, internal rotation, external rotation, external rotation at 90° of abduction, supraspinatus, adduction and abduction, 5/5 in all arcs, biceps/triceps 5/5, excellent  strength\par ¦ Stability : no joint instability on provocative testing, no anterior, posterior or inferior instability\par ¦ Tests: Box negative, Neer negative, Justin negative, drop arm test negative, negative apprehension test, negative Load and Shift test\par \par Gait and Station:\par Gait: gait normal, no significant extremity swelling or lymphedema, good proprioception and balance

## 2021-04-16 NOTE — DISCUSSION/SUMMARY
[de-identified] : I went over the pathophysiology of the patient's symptoms in great detail with the patient. We discussed the use of ice, Tylenol and anti-inflammatories to relieve pain. At-home strengthening, and stretching exercises were discussed and demonstrated with the patient. He should focus on light weight and high repetition exercises. He was advised to avoid positions involving abduction and external rotation to prevent another dislocation. He will d/c physical therapy at this time. All of his questions were answered. He understands and consents to the plan. \par \par FU 6 weeks\par after following a diligent HEP.

## 2021-04-16 NOTE — ADDENDUM
[FreeTextEntry1] : I, Rene Peterson, acted solely as a scribe for Dr. Devon Hoang on this date 04/16/2021.\par All medical record entries made by the Scribe were at my, Dr. Devon Hoang, direction and personally dictated by me on 04/16/2021. I have reviewed the chart and agree that the record accurately reflects my personal performance of the history, physical exam, assessment and plan. I have also personally directed, reviewed, and agreed with the chart.

## 2021-04-16 NOTE — HISTORY OF PRESENT ILLNESS
[de-identified] : 21 year old RHD male presents with right shoulder pain. He fell and dislocated his shoulder on 1/30/21, and it was relocated at [facility]. He denies any numbness and tingling into his hand before or after the reduction. He denies any previous shoulder dislocations. He has continued PT for his shoulder and reports that he has made significant progress. He feels that he has plateaued. He has been able to bench and lift weights without pain. He denies any apprehension in any position. His motion is not restricted.\par \par X-rays of the RIGHT shoulder obtained at [facility] on 1/30/2021 revealed:\par o AP and attempted Y views revealed an anterior inferior dislocation of the right shoulder. Post-reduction views showed a reduced shoulder without any evident fracture.

## 2021-05-28 ENCOUNTER — APPOINTMENT (OUTPATIENT)
Dept: ORTHOPEDIC SURGERY | Facility: CLINIC | Age: 21
End: 2021-05-28
Payer: COMMERCIAL

## 2021-05-28 VITALS
HEART RATE: 86 BPM | SYSTOLIC BLOOD PRESSURE: 126 MMHG | BODY MASS INDEX: 29.52 KG/M2 | WEIGHT: 230 LBS | HEIGHT: 74 IN | DIASTOLIC BLOOD PRESSURE: 79 MMHG

## 2021-05-28 PROCEDURE — 99213 OFFICE O/P EST LOW 20 MIN: CPT

## 2021-05-28 NOTE — DISCUSSION/SUMMARY
[de-identified] : I went over the pathophysiology of the patient's symptoms in great detail with the patient. We discussed the use of ice, Tylenol and anti-inflammatories to relieve pain. At-home strengthening, and stretching exercises were discussed and demonstrated with the patient. He should focus on light weight and high repetition exercises. He was advised to avoid positions involving abduction and external rotation to prevent another dislocation. I would like to see the patient back in the office on an as needed basis to reassess his condition. All of his questions were answered. He understands and consents to the plan. \par \par FU PRN.

## 2021-05-28 NOTE — ADDENDUM
[FreeTextEntry1] : I, Rene Peterson, acted solely as a scribe for Dr. Devon Hoang on this date 05/28/2021.\par All medical record entries made by the Scribe were at my, Dr. Devon Hoang, direction and personally dictated by me on 05/28/2021. I have reviewed the chart and agree that the record accurately reflects my personal performance of the history, physical exam, assessment and plan. I have also personally directed, reviewed, and agreed with the chart.

## 2021-05-28 NOTE — HISTORY OF PRESENT ILLNESS
[de-identified] : 21 year old RHD male presents with right shoulder pain. He fell and dislocated his shoulder on 1/30/21, and it was reduced at [facility]. He denies any numbness and tingling into his hand before or after the reduction. He denies any previous shoulder dislocations. He has been out of PT for about 6 weeks. He has no complaints about his shoulder and states that sometimes it feels like he never dislocated it. He has been able to bench and lift weights without pain.\par \par X-rays of the RIGHT shoulder obtained at [facility] on 1/30/2021:\par o AP and attempted Y views revealed an anterior inferior dislocation of the right shoulder. Post-reduction views showed a reduced shoulder without any evident fracture.

## 2021-07-06 ENCOUNTER — APPOINTMENT (OUTPATIENT)
Dept: FAMILY MEDICINE | Facility: CLINIC | Age: 21
End: 2021-07-06
Payer: COMMERCIAL

## 2021-07-06 VITALS
DIASTOLIC BLOOD PRESSURE: 70 MMHG | HEART RATE: 68 BPM | RESPIRATION RATE: 20 BRPM | WEIGHT: 264 LBS | HEIGHT: 74 IN | BODY MASS INDEX: 33.88 KG/M2 | SYSTOLIC BLOOD PRESSURE: 122 MMHG

## 2021-07-06 DIAGNOSIS — Z23 ENCOUNTER FOR IMMUNIZATION: ICD-10-CM

## 2021-07-06 PROCEDURE — 99395 PREV VISIT EST AGE 18-39: CPT | Mod: 25

## 2021-07-06 PROCEDURE — 90471 IMMUNIZATION ADMIN: CPT

## 2021-07-06 PROCEDURE — 90715 TDAP VACCINE 7 YRS/> IM: CPT

## 2021-07-06 NOTE — COUNSELING
[de-identified] : Healthy eating and activities [None] : None [Good understanding] : Patient has a good understanding of lifestyle changes and steps needed to achieve self management goal

## 2021-07-06 NOTE — PHYSICAL EXAM
[Normal Oropharynx] : the oropharynx was normal [No Abdominal Bruit] : a ~M bruit was not heard ~T in the abdomen [No Varicosities] : no varicosities [Pedal Pulses Present] : the pedal pulses are present [No Edema] : there was no peripheral edema [No Palpable Aorta] : no palpable aorta [No Extremity Clubbing/Cyanosis] : no extremity clubbing/cyanosis [Normal Posterior Cervical Nodes] : no posterior cervical lymphadenopathy [Normal Anterior Cervical Nodes] : no anterior cervical lymphadenopathy [Normal] : no rash [Coordination Grossly Intact] : coordination grossly intact [No Focal Deficits] : no focal deficits [Normal Gait] : normal gait [Speech Grossly Normal] : speech grossly normal [Memory Grossly Normal] : memory grossly normal [Normal Affect] : the affect was normal [Alert and Oriented x3] : oriented to person, place, and time [Normal Mood] : the mood was normal [Normal Insight/Judgement] : insight and judgment were intact

## 2021-07-06 NOTE — HISTORY OF PRESENT ILLNESS
[FreeTextEntry1] : Requests comprehensive exam [de-identified] : Presents for comprehensive exam.  States feeling generally well; did discuss diet with goal of wt loss.  Reviewed screening and immunizations - due for updated Tdap - pt in agreement.

## 2021-07-06 NOTE — ASSESSMENT
[FreeTextEntry1] : Comprehensive exam reveals patient to be hemodynamically stable with acceptable BP\par Pulmonary status stable\par Lab profiles drawn in office and sent\par Tdap given L deltoid

## 2021-07-06 NOTE — HEALTH RISK ASSESSMENT
[Yes] : Yes [2 - 3 times a week (3 pts)] : 2 - 3  times a week (3 points) [1 or 2 (0 pts)] : 1 or 2 (0 points) [Never (0 pts)] : Never (0 points) [No] : In the past 12 months have you used drugs other than those required for medical reasons? No [0] : 2) Feeling down, depressed, or hopeless: Not at all (0) [Patient/Caregiver not ready to engage] : , patient/caregiver not ready to engage [] : No [Audit-CScore] : 3 [AdvancecareDate] : 07/21

## 2021-07-07 LAB
ALBUMIN SERPL ELPH-MCNC: 5 G/DL
ALP BLD-CCNC: 57 U/L
ALT SERPL-CCNC: 47 U/L
ANION GAP SERPL CALC-SCNC: 12 MMOL/L
APPEARANCE: CLEAR
AST SERPL-CCNC: 25 U/L
BASOPHILS # BLD AUTO: 0.05 K/UL
BASOPHILS NFR BLD AUTO: 0.7 %
BILIRUB SERPL-MCNC: 0.5 MG/DL
BILIRUBIN URINE: NEGATIVE
BLOOD URINE: NEGATIVE
BUN SERPL-MCNC: 15 MG/DL
CALCIUM SERPL-MCNC: 10.1 MG/DL
CHLORIDE SERPL-SCNC: 104 MMOL/L
CHOLEST SERPL-MCNC: 237 MG/DL
CO2 SERPL-SCNC: 23 MMOL/L
COLOR: NORMAL
CREAT SERPL-MCNC: 1.13 MG/DL
EOSINOPHIL # BLD AUTO: 0.1 K/UL
EOSINOPHIL NFR BLD AUTO: 1.4 %
ESTIMATED AVERAGE GLUCOSE: 100 MG/DL
GLUCOSE QUALITATIVE U: NEGATIVE
GLUCOSE SERPL-MCNC: 98 MG/DL
HBA1C MFR BLD HPLC: 5.1 %
HCT VFR BLD CALC: 49.1 %
HDLC SERPL-MCNC: 46 MG/DL
HGB BLD-MCNC: 16.4 G/DL
IMM GRANULOCYTES NFR BLD AUTO: 0.3 %
KETONES URINE: NEGATIVE
LDLC SERPL CALC-MCNC: 141 MG/DL
LEUKOCYTE ESTERASE URINE: NEGATIVE
LYMPHOCYTES # BLD AUTO: 2.94 K/UL
LYMPHOCYTES NFR BLD AUTO: 40.4 %
MAN DIFF?: NORMAL
MCHC RBC-ENTMCNC: 30.2 PG
MCHC RBC-ENTMCNC: 33.4 GM/DL
MCV RBC AUTO: 90.4 FL
MONOCYTES # BLD AUTO: 0.47 K/UL
MONOCYTES NFR BLD AUTO: 6.5 %
NEUTROPHILS # BLD AUTO: 3.69 K/UL
NEUTROPHILS NFR BLD AUTO: 50.7 %
NITRITE URINE: NEGATIVE
NONHDLC SERPL-MCNC: 191 MG/DL
PH URINE: 6
PLATELET # BLD AUTO: 305 K/UL
POTASSIUM SERPL-SCNC: 4.2 MMOL/L
PROT SERPL-MCNC: 7.4 G/DL
PROTEIN URINE: NEGATIVE
RBC # BLD: 5.43 M/UL
RBC # FLD: 12.8 %
SODIUM SERPL-SCNC: 138 MMOL/L
SPECIFIC GRAVITY URINE: 1.02
TRIGL SERPL-MCNC: 252 MG/DL
UROBILINOGEN URINE: NORMAL
WBC # FLD AUTO: 7.27 K/UL

## 2021-08-17 ENCOUNTER — APPOINTMENT (OUTPATIENT)
Dept: FAMILY MEDICINE | Facility: CLINIC | Age: 21
End: 2021-08-17
Payer: COMMERCIAL

## 2021-08-17 ENCOUNTER — NON-APPOINTMENT (OUTPATIENT)
Age: 21
End: 2021-08-17

## 2021-08-17 VITALS
OXYGEN SATURATION: 97 % | DIASTOLIC BLOOD PRESSURE: 80 MMHG | SYSTOLIC BLOOD PRESSURE: 116 MMHG | TEMPERATURE: 97.6 F | BODY MASS INDEX: 34.14 KG/M2 | RESPIRATION RATE: 16 BRPM | HEART RATE: 71 BPM | WEIGHT: 266 LBS | HEIGHT: 74 IN

## 2021-08-17 DIAGNOSIS — Z87.09 PERSONAL HISTORY OF OTHER DISEASES OF THE RESPIRATORY SYSTEM: ICD-10-CM

## 2021-08-17 DIAGNOSIS — Z86.59 PERSONAL HISTORY OF OTHER MENTAL AND BEHAVIORAL DISORDERS: ICD-10-CM

## 2021-08-17 DIAGNOSIS — R07.9 CHEST PAIN, UNSPECIFIED: ICD-10-CM

## 2021-08-17 PROCEDURE — 99213 OFFICE O/P EST LOW 20 MIN: CPT

## 2021-08-17 RX ORDER — HYDROXYZINE HYDROCHLORIDE 25 MG/1
25 TABLET ORAL DAILY
Qty: 20 | Refills: 0 | Status: COMPLETED | COMMUNITY
Start: 2018-08-17 | End: 2021-08-17

## 2021-08-17 RX ORDER — CEPHALEXIN 500 MG/1
500 CAPSULE ORAL 4 TIMES DAILY
Qty: 28 | Refills: 0 | Status: COMPLETED | COMMUNITY
Start: 2018-08-17 | End: 2021-08-17

## 2021-08-17 RX ORDER — AMOXICILLIN AND CLAVULANATE POTASSIUM 500; 125 MG/1; MG/1
500-125 TABLET, FILM COATED ORAL
Qty: 14 | Refills: 0 | Status: COMPLETED | COMMUNITY
Start: 2020-03-30 | End: 2021-08-17

## 2021-08-17 RX ORDER — DOXYCYCLINE HYCLATE 100 MG/1
100 CAPSULE ORAL TWICE DAILY
Qty: 14 | Refills: 0 | Status: COMPLETED | COMMUNITY
Start: 2019-05-10 | End: 2021-08-17

## 2021-08-17 RX ORDER — MUPIROCIN 2 G/100G
2 CREAM TOPICAL 3 TIMES DAILY
Qty: 1 | Refills: 0 | Status: COMPLETED | COMMUNITY
Start: 2018-08-17 | End: 2021-08-17

## 2021-08-17 RX ORDER — MUPIROCIN 20 MG/G
2 OINTMENT TOPICAL TWICE DAILY
Qty: 1 | Refills: 0 | Status: COMPLETED | COMMUNITY
Start: 2018-08-17 | End: 2021-08-17

## 2021-08-17 RX ORDER — ALBUTEROL SULFATE 90 UG/1
108 (90 BASE) AEROSOL, METERED RESPIRATORY (INHALATION)
Qty: 8.5 | Refills: 2 | Status: COMPLETED | COMMUNITY
Start: 2017-03-31 | End: 2021-08-17

## 2021-08-17 NOTE — PHYSICAL EXAM
[No Acute Distress] : no acute distress [Well Nourished] : well nourished [Well Developed] : well developed [Well-Appearing] : well-appearing [Normal Sclera/Conjunctiva] : normal sclera/conjunctiva [PERRL] : pupils equal round and reactive to light [EOMI] : extraocular movements intact [Normal Outer Ear/Nose] : the outer ears and nose were normal in appearance [Normal Oropharynx] : the oropharynx was normal [No Lymphadenopathy] : no lymphadenopathy [No JVD] : no jugular venous distention [Supple] : supple [Thyroid Normal, No Nodules] : the thyroid was normal and there were no nodules present [No Respiratory Distress] : no respiratory distress  [No Accessory Muscle Use] : no accessory muscle use [Clear to Auscultation] : lungs were clear to auscultation bilaterally [Normal Rate] : normal rate  [Regular Rhythm] : with a regular rhythm [Normal S1, S2] : normal S1 and S2 [No Murmur] : no murmur heard [No Carotid Bruits] : no carotid bruits [No Abdominal Bruit] : a ~M bruit was not heard ~T in the abdomen [No Varicosities] : no varicosities [Pedal Pulses Present] : the pedal pulses are present [No Edema] : there was no peripheral edema [No Palpable Aorta] : no palpable aorta [No Extremity Clubbing/Cyanosis] : no extremity clubbing/cyanosis [Soft] : abdomen soft [Non Tender] : non-tender [Non-distended] : non-distended [No Masses] : no abdominal mass palpated [No HSM] : no HSM [Normal Bowel Sounds] : normal bowel sounds [Normal Posterior Cervical Nodes] : no posterior cervical lymphadenopathy [Normal Anterior Cervical Nodes] : no anterior cervical lymphadenopathy [No CVA Tenderness] : no CVA  tenderness [No Spinal Tenderness] : no spinal tenderness [No Joint Swelling] : no joint swelling [Grossly Normal Strength/Tone] : grossly normal strength/tone [No Rash] : no rash [Coordination Grossly Intact] : coordination grossly intact [Normal Gait] : normal gait [No Focal Deficits] : no focal deficits [Deep Tendon Reflexes (DTR)] : deep tendon reflexes were 2+ and symmetric [Normal Affect] : the affect was normal [Normal Insight/Judgement] : insight and judgment were intact [de-identified] : mild tenderness noted over medial aspect of left pectoral muscle

## 2021-08-17 NOTE — HISTORY OF PRESENT ILLNESS
Problem: Postpartum, Vaginal Delivery (Adult)  Goal: Signs and Symptoms of Listed Potential Problems Will be Absent or Manageable (Postpartum, Vaginal Delivery)  Outcome: Ongoing (interventions implemented as appropriate)    08/24/17 1730   Postpartum, Vaginal Delivery   Problems Assessed (Postpartum Vaginal Delivery) all   Problems Present (Postpartum Vaginal Delivery) none  (Monitor VS, fundal position & lochia flow, breastfeeding edu)            [FreeTextEntry8] : One week of chest discomfort. \par \par Noted pressure like pain in left upper chest, shoulder and left triceps\par Worst when he lays down. Lack of movement makes it worse. \par "feels weird - tingly in nature."\par \par Pain noted as a 4 out of 10.\par \par Started initially with a spasm \par \par Pain notes as intermittent pain - lasts for\par \par

## 2021-10-21 ENCOUNTER — EMERGENCY (EMERGENCY)
Facility: HOSPITAL | Age: 21
LOS: 1 days | Discharge: HOME | End: 2021-10-21
Attending: EMERGENCY MEDICINE | Admitting: EMERGENCY MEDICINE
Payer: COMMERCIAL

## 2021-10-21 VITALS
DIASTOLIC BLOOD PRESSURE: 82 MMHG | RESPIRATION RATE: 17 BRPM | HEART RATE: 88 BPM | OXYGEN SATURATION: 98 % | SYSTOLIC BLOOD PRESSURE: 136 MMHG | TEMPERATURE: 98 F

## 2021-10-21 VITALS
HEART RATE: 132 BPM | HEIGHT: 74 IN | OXYGEN SATURATION: 97 % | TEMPERATURE: 98 F | SYSTOLIC BLOOD PRESSURE: 130 MMHG | RESPIRATION RATE: 20 BRPM | DIASTOLIC BLOOD PRESSURE: 85 MMHG | WEIGHT: 270.07 LBS

## 2021-10-21 PROCEDURE — 99284 EMERGENCY DEPT VISIT MOD MDM: CPT | Mod: 57

## 2021-10-21 PROCEDURE — 96375 TX/PRO/DX INJ NEW DRUG ADDON: CPT | Mod: XU

## 2021-10-21 PROCEDURE — 73030 X-RAY EXAM OF SHOULDER: CPT

## 2021-10-21 PROCEDURE — 73030 X-RAY EXAM OF SHOULDER: CPT | Mod: 26,RT

## 2021-10-21 PROCEDURE — 23650 CLTX SHO DSLC W/MNPJ WO ANES: CPT

## 2021-10-21 PROCEDURE — 23650 CLTX SHO DSLC W/MNPJ WO ANES: CPT | Mod: 54

## 2021-10-21 PROCEDURE — 96374 THER/PROPH/DIAG INJ IV PUSH: CPT | Mod: XU

## 2021-10-21 PROCEDURE — 99285 EMERGENCY DEPT VISIT HI MDM: CPT | Mod: 25

## 2021-10-21 RX ORDER — ONDANSETRON 8 MG/1
4 TABLET, FILM COATED ORAL ONCE
Refills: 0 | Status: COMPLETED | OUTPATIENT
Start: 2021-10-21 | End: 2021-10-21

## 2021-10-21 RX ORDER — IBUPROFEN 200 MG
1 TABLET ORAL
Qty: 30 | Refills: 0
Start: 2021-10-21

## 2021-10-21 RX ORDER — KETOROLAC TROMETHAMINE 30 MG/ML
30 SYRINGE (ML) INJECTION ONCE
Refills: 0 | Status: DISCONTINUED | OUTPATIENT
Start: 2021-10-21 | End: 2021-10-21

## 2021-10-21 RX ORDER — FENTANYL CITRATE 50 UG/ML
50 INJECTION INTRAVENOUS ONCE
Refills: 0 | Status: DISCONTINUED | OUTPATIENT
Start: 2021-10-21 | End: 2021-10-21

## 2021-10-21 RX ORDER — FENTANYL CITRATE 50 UG/ML
100 INJECTION INTRAVENOUS ONCE
Refills: 0 | Status: DISCONTINUED | OUTPATIENT
Start: 2021-10-21 | End: 2021-10-21

## 2021-10-21 RX ORDER — HYDROMORPHONE HYDROCHLORIDE 2 MG/ML
1 INJECTION INTRAMUSCULAR; INTRAVENOUS; SUBCUTANEOUS ONCE
Refills: 0 | Status: DISCONTINUED | OUTPATIENT
Start: 2021-10-21 | End: 2021-10-21

## 2021-10-21 RX ADMIN — HYDROMORPHONE HYDROCHLORIDE 1 MILLIGRAM(S): 2 INJECTION INTRAMUSCULAR; INTRAVENOUS; SUBCUTANEOUS at 02:55

## 2021-10-21 RX ADMIN — FENTANYL CITRATE 50 MICROGRAM(S): 50 INJECTION INTRAVENOUS at 03:10

## 2021-10-21 RX ADMIN — FENTANYL CITRATE 100 MICROGRAM(S): 50 INJECTION INTRAVENOUS at 03:23

## 2021-10-21 RX ADMIN — FENTANYL CITRATE 50 MICROGRAM(S): 50 INJECTION INTRAVENOUS at 03:13

## 2021-10-21 RX ADMIN — Medication 30 MILLIGRAM(S): at 02:55

## 2021-10-21 RX ADMIN — ONDANSETRON 4 MILLIGRAM(S): 8 TABLET, FILM COATED ORAL at 02:55

## 2021-10-21 NOTE — ED PROVIDER NOTE - CLINICAL SUMMARY MEDICAL DECISION MAKING FREE TEXT BOX
acute R shoulder pain after pulling. clinically dislocated shoulder. check xr, r/o fx.  NVI.  iv analgesics.    xr shows dislocation. shoulder reduced in ED. sling applied. f/u ortho acute R shoulder pain after pulling. clinically dislocated shoulder. check xr, r/o fx.  NVI.  iv analgesics.    xr shows dislocation. shoulder reduced in ED. sling applied. f/u ortho. pt fully awake, ambulatory on dc.

## 2021-10-21 NOTE — ED PROVIDER NOTE - NSFOLLOWUPINSTRUCTIONS_ED_ALL_ED_FT
- take ibuprofen for pain  - take percocet in addition for stronger pain  - keep arm in sling  - see orthopedist this week      Shoulder Dislocation    WHAT YOU NEED TO KNOW:    A shoulder dislocation happens when the top of your arm bone (humerus) moves out of the socket in your shoulder blade.    Dislocated Shoulder         DISCHARGE INSTRUCTIONS:    Return to the emergency department if:   •Your shoulder and arm become pale or cold.      •You cannot move your shoulder and arm.      •You have more redness or swelling in your shoulder.      •Your shoulder becomes dislocated again.      Call your doctor if:   •You have a fever.      •You have more pain in your shoulder and arm even after you rest and take your medicine.      •You have new weakness or numbness in your shoulder and arm.      •You have questions or concerns about your condition or care.      Medicines:You may need any of the following:   •Prescription pain medicine may be given. Ask your healthcare provider how to take this medicine safely. Some prescription pain medicines contain acetaminophen. Do not take other medicines that contain acetaminophen without talking to your healthcare provider. Too much acetaminophen may cause liver damage. Prescription pain medicine may cause constipation. Ask your healthcare provider how to prevent or treat constipation.       •A muscle relaxer may help the tight muscles in your shoulder relax.      •Take your medicine as directed. Contact your healthcare provider if you think your medicine is not helping or if you have side effects. Tell him or her if you are allergic to any medicine. Keep a list of the medicines, vitamins, and herbs you take. Include the amounts, and when and why you take them. Bring the list or the pill bottles to follow-up visits. Carry your medicine list with you in case of an emergency.      Rest your shoulder and arm: Rest helps your muscles and tissues heal. Avoid activities that cause pain or use an overhead arm motion. Your healthcare provider will tell you when it is safe to return to sports or other daily activities.    How to wear a brace, sling, or splint: A brace, sling, or splint may be needed to limit your movement and protect your shoulder.    Shoulder Sling     •Wear your brace, sling, or splint all the time. Take it off only to bathe or do exercises as directed. Ask your healthcare provider how many weeks you should wear it.      •Keep your skin clean and dry. Place padding under your armpit to help absorb sweat and prevent sores on your skin.      •Do not hunch your shoulders. This may cause pain. Keep your shoulders relaxed.      •Support your wrist and hand with the sling. Cover the knuckles on your hand with your sling. Your wrist should be positioned higher than your elbow. Your wrist may start to hurt or go numb if your sling is too short.      Apply ice: Ice helps decrease swelling and pain. Ice may also help prevent tissue damage. Use an ice pack, or put crushed ice in a plastic bag. Cover it with a towel before you place it on your shoulder. Apply ice for 15 to 20 minutes every hour or as directed.    Exercises: Begin gentle exercises as directed. After healing begins, you may start light exercises so your shoulder does not get stiff. Go to physical therapy if directed. A physical therapist teaches you exercises to help improve movement and strength, and to decrease pain. Do not lift heavy objects or do any exercise that causes severe pain.    Follow up with your doctor in 5 to 10 days: Write down your questions so you remember to ask them during your visits.

## 2021-10-21 NOTE — ED PROVIDER NOTE - MUSCULOSKELETAL, MLM
R shoulder: held adducted. unable to range. "squared off" appearance of shoulder contour. mild ttp R shoulder. R elbow/fingers normal sensation and ROM, nontender. 2+ rad pulse

## 2021-10-21 NOTE — ED PROVIDER NOTE - CARE PROVIDER_API CALL
Forrest Webster)  Orthopaedic Sports Medicine; Orthopaedic Surgery  [address] [facility] [address]  [place] [address]  Phone: [phone]  Fax: [phone]  Follow Up Time: 1-3 Days

## 2021-10-21 NOTE — ED ADULT NURSE NOTE - OBJECTIVE STATEMENT
22y/o male presents to the ED  from home c/o R Shoulder pain with dislocation around 0200 after trying to lift something heavy. Pt is AOx4, patent airway, clear lung sounds, soft non tender abdomen, strong peripheral pulses, skin is warm dry and intact, no changes in bowel/bladder patterns, ambulates independently. Patient denies fever, chills, n/v, weakness, abd pain, diarrhea/constipation, numbness/tingling, urinary s/s, in no respiratory distress, no chest pain, Patient safety provided with call bell within reach and bed in the lowest position.

## 2021-10-21 NOTE — ED PROVIDER NOTE - OBJECTIVE STATEMENT
pt c h/o R shoulder dislocation jan 2021 c/o severe sharp R shoulder pain 45 min pta after pulling on something hard. felt like shoulder was dislocated.  no numbness/weakness. pain worse c any mvmt of R shoulder.

## 2021-10-21 NOTE — ED PROVIDER NOTE - PATIENT PORTAL LINK FT
You can access the FollowMyHealth Patient Portal offered by [facility] by registering at the following website: [email or web address]. By joining [facility]’s FollowMyHealth portal, you will also be able to view your health information using other applications (apps) compatible with our system.

## 2021-11-04 ENCOUNTER — APPOINTMENT (OUTPATIENT)
Dept: ORTHOPEDIC SURGERY | Facility: CLINIC | Age: 21
End: 2021-11-04
Payer: COMMERCIAL

## 2021-11-04 PROCEDURE — 99214 OFFICE O/P EST MOD 30 MIN: CPT

## 2021-11-04 NOTE — DISCUSSION/SUMMARY
[de-identified] : The underlying pathophysiology was reviewed in great detail with the patient as well as the various treatment options, including ice, analgesics, NSAIDs, Physical therapy, steroid injections.\par \par A prescription was provided for a MRI of the right shoulder to rule out labrum tear \par \par Activity modifications and restrictions were discussed. I advised avoiding overhead lifting. I advised the patient to work on good posture. Discussed avoiding end range external rotation and abduction. \par \par FU once MRI results are obtained. \par \par All questions were answered, all alternatives discussed and the patient is in complete agreement with that plan. Follow-up appointment as instructed. Any issues and the patient will call or come in sooner.

## 2021-11-04 NOTE — HISTORY OF PRESENT ILLNESS
[de-identified] : 21 year old RHD male presents with right shoulder pain. He sustained a second shoulder dislocation on 10/21/2021 after trying to pull a piece of car material that was stuck under his car. He went to [facility] ED after the incident where he had the shoulder reduced. He suffered a previous dislocation after  He fell and dislocated his shoulder on 1/30/21, and it was reduced at [facility]. \par He was seen and treated by Dr. Hoang for the first dislocation. He completed a course of physical therapy noting improvements in strength and range of motion. He notes no instabiiity feelings after completion of PT. Denies current numbness and tingling. He notes diffuse shoulder pain at this time. \par His  symptoms are exacerbated with any movement of the shoulder. Patient reports the pain is not waking him up at night.  Patient reports associated weakness. Denies numbness and tingling in the upper extremity. Patient is taking NSAIDs for pain relief with mild to moderate relief in symptoms.\par

## 2021-11-12 ENCOUNTER — OUTPATIENT (OUTPATIENT)
Dept: OUTPATIENT SERVICES | Facility: HOSPITAL | Age: 21
LOS: 1 days | End: 2021-11-12
Payer: COMMERCIAL

## 2021-11-12 ENCOUNTER — APPOINTMENT (OUTPATIENT)
Dept: MRI IMAGING | Facility: HOSPITAL | Age: 21
End: 2021-11-12
Payer: COMMERCIAL

## 2021-11-12 DIAGNOSIS — Z00.8 ENCOUNTER FOR OTHER GENERAL EXAMINATION: ICD-10-CM

## 2021-11-12 PROCEDURE — 73221 MRI JOINT UPR EXTREM W/O DYE: CPT | Mod: 26,RT

## 2021-11-12 PROCEDURE — 73221 MRI JOINT UPR EXTREM W/O DYE: CPT

## 2021-11-22 ENCOUNTER — NON-APPOINTMENT (OUTPATIENT)
Age: 21
End: 2021-11-22

## 2021-12-06 ENCOUNTER — APPOINTMENT (OUTPATIENT)
Dept: ORTHOPEDIC SURGERY | Facility: CLINIC | Age: 21
End: 2021-12-06

## 2021-12-13 ENCOUNTER — APPOINTMENT (OUTPATIENT)
Dept: ORTHOPEDIC SURGERY | Facility: CLINIC | Age: 21
End: 2021-12-13
Payer: COMMERCIAL

## 2021-12-13 VITALS — HEIGHT: 74 IN | BODY MASS INDEX: 34.65 KG/M2 | WEIGHT: 270 LBS

## 2021-12-13 PROCEDURE — 99214 OFFICE O/P EST MOD 30 MIN: CPT

## 2021-12-13 NOTE — DISCUSSION/SUMMARY
[de-identified] : The underlying pathophysiology was reviewed in great detail with the patient as well as the various treatment options, including ice, analgesics, NSAIDs, Physical therapy, steroid injections and surgery.\par \par MRI of the right shoulder was reviewed and discussed in great detail today. \par \par  The risks and benefits of arthroscopic Bankart repair  were reviewed and discussed in great detail in addition to conservative management of physical therapy, activity modification. \par \par The risks and benefits of a RIGHT SHOULDER BANKART REPAIR were discussed in great detail today, including but not limited to bleeding, infection, nerve injury, DVT, allergy to the anesthetic or to the implants, persistent pain, stiffness, scarring, swelling or deformity, re-tear. \par He would like to discuss with his mom prior to scheduling surgery and will be in touch with my surgical coordinator when he  decides. \par \par I am prescribing this mechanical DVT Prophylaxis as an alternative/ addition to pharmacological anticoagulants. I consider this form of mechanical prophylaxis to be an equally effective protocol in the post-operative prevention of DVT and PE without excessive bleeding, other potential risk factors and contraindications. I am prescribing this mechanical prophylaxis as in order to help with localized edema and to improve circulation.  This desired mechanical prophylaxis will also benefit the patient if hypertension is present.\par \par I am prescribing the use of NMES (neuromuscular electrical stimulator, Manaflexx 2) to aid in disuse atrophy. The device is to be used for at-home treatment following surgery to help stimulate the surrounding muscles and prevent atrophy that would otherwise occur due to lack of use.\par  \par \par Activity modifications and restrictions were discussed. I advised avoiding overhead lifting. I advised the patient to work on good posture. Discussed avoiding end range external rotation and abduction. \par \par \par All questions were answered, all alternatives discussed and the patient is in complete agreement with that plan. Follow-up appointment as instructed. Any issues and the patient will call or come in sooner.

## 2021-12-13 NOTE — PHYSICAL EXAM
[de-identified] : Right Upper Extremity\par o Shoulder :\par ¦ Inspection/Palpation : tenderness over the greater tuberosity, no acromioclavicular joint tenderness,  tenderness anterior and posterior glenohumeral joint,no swelling, no deformities\par ¦ Range of Motion : ACTIVE FORWARD ELEVATION: Measured at 150 degrees, ACTIVE EXTERNAL ROTATION: Measured at 55 degrees, ACTIVE INTERNAL ROTATION: Measured at L1\par ¦ Strength : external rotation 5/5, internal rotation 5/5, supraspinatus 5/5 with pain. \par ¦ Stability : Apprehension (+), Surprise test (+)\par ¦ Tests/Signs : Neer (+), Box (+), Burgess (+) \par o Upper Arm : no tenderness, no swelling, no deformities\par o Muscle Bulk : no atrophy\par o Sensation : sensation intact to light touch\par o Skin : no skin rash or discoloration\par o Vascular Exam : no edema, no cyanosis, radial and ulnar pulses normal\par \par Left Upper Extremity\par o Shoulder :\par ¦ Inspection/Palpation :  no tenderness over the greater tuberosity, no acromioclavicular joint tenderness, no tenderness anterior and posterior glenohumeral joint,no swelling, no deformities\par ¦ Range of Motion : ACTIVE FORWARD ELEVATION: Measured at 150 degrees, ACTIVE EXTERNAL ROTATION: Measured at 75 degrees, ACTIVE INTERNAL ROTATION: Measured at T8\par ¦ Strength : external rotation 5/5, internal rotation 5/5, supraspinatus 5/5\par ¦ Stability : no joint instability on provocative testing\par ¦ Tests/Signs : Neer (-), Box (-)\par o Upper Arm : no tenderness, no swelling, no deformities\par o Muscle Bulk : no atrophy\par o Sensation : sensation intact to light touch\par o Skin : no skin rash or discoloration\par o Vascular Exam : no edema, no cyanosis, radial and ulnar pulses normal  [de-identified] : Patient comes to today's visit with outside imaging already performed. I reviewed the images in detail with the patient and discussed the findings as highlighted below.\par \par \par \par \par EXAM: MR SHOULDER RT\par \par \par PROCEDURE DATE: 11/12/2021\par \par \par \par INTERPRETATION: RIGHT SHOULDER MRI\par \par CLINICAL INFORMATION: Moderate right shoulder pain. Prior shoulder dislocations.\par TECHNIQUE: Multiplanar, multisequence MR imaging was obtained of the right shoulder.\par COMPARISON: Shoulder radiograph dated 10/21/2021\par \par FINDINGS:\par \par ROTATOR CUFF: The supraspinatus, infraspinatus, teres minor, and subscapularis tendons are normal in appearance.\par BICEPS TENDON: Within the groove and normal in appearance.\par AC JOINT: Mild lateral downsloping of the acromion.\par GLENOID LABRUM AND GLENOHUMERAL LIGAMENTS: There is curvilinear signal at the base of the anterior-inferior labrum suggestive of a nondisplaced tear.\par GLENOHUMERAL CARTILAGE AND SUBCHONDRAL BONE: Preserved cartilage\par SYNOVIUM/JOINT FLUID: No joint effusion. No fluid in the subacromial/subdeltoid bursa\par BONE MARROW: There is a mild to moderate Hill-Sachs deformity at the posterior lateral humeral head measuring up to 1.4 x 1.3 cm with associated marrow edema. No definite osseous Bankart injury noted.\par MUSCLES: Preserved musculature\par NEUROVASCULAR STRUCTURES: Preserved\par SUBCUTANEOUS SOFT TISSUES: No soft tissue swelling.\par \par IMPRESSION:\par \par Findings suggestive of a recent dislocation with edematous Hill-Sachs deformity and nonosseous Bankart injury. CT can be performed for more sensitive evaluation for glenoid fracture if warranted.\par No rotator cuff tear\par \par --- End of Report ---\par \par \par ----------------------------------------------------------------------------------------------------------------------------------------------------------------------------------- \par \par EXAM: XR SHOULDER COMP MIN 2V RT\par EXAM: XR SHOULDER COMP MIN 2V RT\par \par PROCEDURE DATE: 10/21/2021\par \par \par INTERPRETATION: CLINICAL STATEMENT: Pain.\par \par TECHNIQUE: AP view right shoulder pre and post reduction\par \par COMPARISON: 1/30/2021\par \par FINDINGS:\par Anterior dislocation noted. No gross acute fracture\par \par The acromioclavicular joint is intact.\par \par Postreduction film demonstrates successful reduction of anterior dislocation\par \par IMPRESSION:\par Findings as described above\par \par --- End of Report ---\par \par \par \par X-rays of the RIGHT shoulder obtained at [facility] on 1/30/2021:\par o AP and attempted Y views revealed an anterior inferior dislocation of the right shoulder. Post-reduction views showed a reduced shoulder without any evident fracture.

## 2021-12-13 NOTE — HISTORY OF PRESENT ILLNESS
[de-identified] : 21 year old RHD male presents with right shoulder pain. He sustained a second shoulder dislocation on 10/21/2021 after trying to pull a piece of car material that was stuck under his car. He went to [facility] ED after the incident where he had the shoulder reduced. He suffered a previous dislocation after  He fell and dislocated his shoulder on 1/30/21, and it was reduced at [facility]. \par He was seen and treated by Dr. Hoang for the first dislocation. He completed a course of physical therapy noting improvements in strength and range of motion. He notes no instability feelings after completion of PT. Denies current numbness and tingling. He notes diffuse shoulder pain at this time. \par His  symptoms are exacerbated with any movement of the shoulder. Patient reports the pain is not waking him up at night.  Patient reports associated weakness. Denies numbness and tingling in the upper extremity. Patient is taking NSAIDs for pain relief with mild to moderate relief in symptoms. He is here today for MRI review. \par

## 2022-02-25 NOTE — ED PROVIDER NOTE - CONSTITUTIONAL DISTRESS
Pt presents to ED with c/o cp.   Pt reports pain to left side of chest, pt denies sob.   Pt denies n/v/d.   Pt reports chills, denies fever.      MILD

## 2022-03-25 ENCOUNTER — OUTPATIENT (OUTPATIENT)
Dept: OUTPATIENT SERVICES | Facility: HOSPITAL | Age: 22
LOS: 1 days | End: 2022-03-25
Payer: COMMERCIAL

## 2022-03-25 VITALS
RESPIRATION RATE: 16 BRPM | HEIGHT: 74 IN | SYSTOLIC BLOOD PRESSURE: 134 MMHG | HEART RATE: 82 BPM | OXYGEN SATURATION: 96 % | TEMPERATURE: 97 F | WEIGHT: 304.9 LBS | DIASTOLIC BLOOD PRESSURE: 84 MMHG

## 2022-03-25 DIAGNOSIS — M25.311 OTHER INSTABILITY, RIGHT SHOULDER: ICD-10-CM

## 2022-03-25 DIAGNOSIS — Z01.818 ENCOUNTER FOR OTHER PREPROCEDURAL EXAMINATION: ICD-10-CM

## 2022-03-25 DIAGNOSIS — Z90.89 ACQUIRED ABSENCE OF OTHER ORGANS: Chronic | ICD-10-CM

## 2022-03-25 PROCEDURE — G0463: CPT

## 2022-03-25 NOTE — H&P PST ADULT - NSICDXFAMILYHX_GEN_ALL_CORE_FT
FAMILY HISTORY:  Father  Still living? No  Family hx of lung cancer, Age at diagnosis: Age Unknown

## 2022-03-25 NOTE — H&P PST ADULT - NSANTHOSAYNRD_GEN_A_CORE
No. GEOFF screening performed.  STOP BANG Legend: 0-2 = LOW Risk; 3-4 = INTERMEDIATE Risk; 5-8 = HIGH Risk

## 2022-04-07 PROBLEM — Z87.39 PERSONAL HISTORY OF OTHER DISEASES OF THE MUSCULOSKELETAL SYSTEM AND CONNECTIVE TISSUE: Chronic | Status: ACTIVE | Noted: 2022-03-25

## 2022-04-07 PROBLEM — Z87.09 PERSONAL HISTORY OF OTHER DISEASES OF THE RESPIRATORY SYSTEM: Chronic | Status: ACTIVE | Noted: 2022-03-25

## 2022-04-11 ENCOUNTER — OUTPATIENT (OUTPATIENT)
Dept: OUTPATIENT SERVICES | Facility: HOSPITAL | Age: 22
LOS: 1 days | End: 2022-04-11
Payer: COMMERCIAL

## 2022-04-11 DIAGNOSIS — M25.311 OTHER INSTABILITY, RIGHT SHOULDER: ICD-10-CM

## 2022-04-11 DIAGNOSIS — Z90.89 ACQUIRED ABSENCE OF OTHER ORGANS: Chronic | ICD-10-CM

## 2022-04-11 DIAGNOSIS — Z01.818 ENCOUNTER FOR OTHER PREPROCEDURAL EXAMINATION: ICD-10-CM

## 2022-04-11 DIAGNOSIS — Z20.828 CONTACT WITH AND (SUSPECTED) EXPOSURE TO OTHER VIRAL COMMUNICABLE DISEASES: ICD-10-CM

## 2022-04-11 LAB — SARS-COV-2 RNA SPEC QL NAA+PROBE: SIGNIFICANT CHANGE UP

## 2022-04-11 PROCEDURE — U0003: CPT

## 2022-04-11 PROCEDURE — U0005: CPT

## 2022-04-12 ENCOUNTER — TRANSCRIPTION ENCOUNTER (OUTPATIENT)
Age: 22
End: 2022-04-12

## 2022-04-12 NOTE — ASU PATIENT PROFILE, ADULT - FALL HARM RISK - UNIVERSAL INTERVENTIONS
Bed in lowest position, wheels locked, appropriate side rails in place/Call bell, personal items and telephone in reach/Instruct patient to call for assistance before getting out of bed or chair/Non-slip footwear when patient is out of bed/[place] to call system/Physically safe environment - no spills, clutter or unnecessary equipment/Purposeful Proactive Rounding/Room/bathroom lighting operational, light cord in reach

## 2022-04-13 ENCOUNTER — OUTPATIENT (OUTPATIENT)
Dept: OUTPATIENT SERVICES | Facility: HOSPITAL | Age: 22
LOS: 1 days | End: 2022-04-13
Payer: COMMERCIAL

## 2022-04-13 ENCOUNTER — APPOINTMENT (OUTPATIENT)
Dept: ORTHOPEDIC SURGERY | Facility: HOSPITAL | Age: 22
End: 2022-04-13

## 2022-04-13 ENCOUNTER — TRANSCRIPTION ENCOUNTER (OUTPATIENT)
Age: 22
End: 2022-04-13

## 2022-04-13 VITALS
SYSTOLIC BLOOD PRESSURE: 155 MMHG | DIASTOLIC BLOOD PRESSURE: 87 MMHG | HEART RATE: 96 BPM | OXYGEN SATURATION: 99 % | RESPIRATION RATE: 20 BRPM | WEIGHT: 277.78 LBS | HEIGHT: 74 IN | TEMPERATURE: 97 F

## 2022-04-13 VITALS — HEART RATE: 75 BPM | DIASTOLIC BLOOD PRESSURE: 67 MMHG | RESPIRATION RATE: 22 BRPM | SYSTOLIC BLOOD PRESSURE: 114 MMHG

## 2022-04-13 DIAGNOSIS — M25.311 OTHER INSTABILITY, RIGHT SHOULDER: ICD-10-CM

## 2022-04-13 DIAGNOSIS — Z90.89 ACQUIRED ABSENCE OF OTHER ORGANS: Chronic | ICD-10-CM

## 2022-04-13 DIAGNOSIS — Z01.818 ENCOUNTER FOR OTHER PREPROCEDURAL EXAMINATION: ICD-10-CM

## 2022-04-13 PROCEDURE — 29806 SHO ARTHRS SRG CAPSULORRAPHY: CPT | Mod: RT

## 2022-04-13 PROCEDURE — C9399: CPT

## 2022-04-13 PROCEDURE — C1713: CPT

## 2022-04-13 PROCEDURE — 29807 SHO ARTHRS SRG RPR SLAP LES: CPT | Mod: LT

## 2022-04-13 DEVICE — SUT ANCHOR FIBERTAK NO2: Type: IMPLANTABLE DEVICE | Site: RIGHT | Status: FUNCTIONAL

## 2022-04-13 DEVICE — IMPLANTABLE DEVICE: Type: IMPLANTABLE DEVICE | Site: RIGHT | Status: FUNCTIONAL

## 2022-04-13 DEVICE — ANCHOR SUT TAK BIO-COMP W/ 2 FIBERWIRE 3X14.5MM: Type: IMPLANTABLE DEVICE | Site: RIGHT | Status: FUNCTIONAL

## 2022-04-13 DEVICE — ANCHOR SUT BIO-COMP PUSHLOCK 2.9X12.5MM MUST ORD IN 5EA: Type: IMPLANTABLE DEVICE | Site: RIGHT | Status: FUNCTIONAL

## 2022-04-13 DEVICE — ANCHOR BIO-COMP 3 FIBERWIRE 5.5X14.7MM: Type: IMPLANTABLE DEVICE | Site: RIGHT | Status: FUNCTIONAL

## 2022-04-13 RX ORDER — HYDROMORPHONE HYDROCHLORIDE 2 MG/ML
0.5 INJECTION INTRAMUSCULAR; INTRAVENOUS; SUBCUTANEOUS
Refills: 0 | Status: DISCONTINUED | OUTPATIENT
Start: 2022-04-13 | End: 2022-04-14

## 2022-04-13 RX ORDER — SODIUM CHLORIDE 9 MG/ML
1000 INJECTION, SOLUTION INTRAVENOUS
Refills: 0 | Status: DISCONTINUED | OUTPATIENT
Start: 2022-04-13 | End: 2022-04-14

## 2022-04-13 RX ORDER — CEFAZOLIN SODIUM 1 G
3000 VIAL (EA) INJECTION ONCE
Refills: 0 | Status: COMPLETED | OUTPATIENT
Start: 2022-04-13 | End: 2022-04-13

## 2022-04-13 RX ORDER — APREPITANT 80 MG/1
40 CAPSULE ORAL ONCE
Refills: 0 | Status: COMPLETED | OUTPATIENT
Start: 2022-04-13 | End: 2022-04-13

## 2022-04-13 RX ORDER — CHLORHEXIDINE GLUCONATE 213 G/1000ML
1 SOLUTION TOPICAL ONCE
Refills: 0 | Status: COMPLETED | OUTPATIENT
Start: 2022-04-13 | End: 2022-04-13

## 2022-04-13 RX ORDER — ACETAMINOPHEN 500 MG
1000 TABLET ORAL ONCE
Refills: 0 | Status: COMPLETED | OUTPATIENT
Start: 2022-04-13 | End: 2022-04-13

## 2022-04-13 RX ORDER — ONDANSETRON 8 MG/1
4 TABLET, FILM COATED ORAL ONCE
Refills: 0 | Status: DISCONTINUED | OUTPATIENT
Start: 2022-04-13 | End: 2022-04-14

## 2022-04-13 RX ADMIN — APREPITANT 40 MILLIGRAM(S): 80 CAPSULE ORAL at 06:40

## 2022-04-13 NOTE — ASU DISCHARGE PLAN (ADULT/PEDIATRIC) - ASU DC SPECIAL INSTRUCTIONSFT
Please read the pamphlet provided for the care of the right shoulder.  External rotation of the right arm at about 30 degrees only, no forward flexion past 90 degrees.    Follow all verbal and written instructions. Take medications as prescribed. DO NOT drive, operate machinery, and/or make important decisions while on prescription pain medication. DO NOT hesitate to call Doctor's office with questions or concerns.     For Constipation :   • Increase your water intake. Drink at least 8 glasses of water daily.  • Try adding fiber to your diet by eating fruits, vegetables and foods that are rich in grains.  • If you do experience constipation, you may take an over-the-counter stool softener/laxative such as Neha Colace, Senekot or  Milk of Magnesia.

## 2022-04-13 NOTE — ASU DISCHARGE PLAN (ADULT/PEDIATRIC) - CALL YOUR DOCTOR IF YOU HAVE ANY OF THE FOLLOWING:
Wound/Surgical Site with redness, or foul smelling discharge or pus Bleeding that does not stop/Swelling that gets worse/Pain not relieved by Medications/Wound/Surgical Site with redness, or foul smelling discharge or pus/Numbness, tingling, color or temperature change to extremity

## 2022-04-13 NOTE — ASU DISCHARGE PLAN (ADULT/PEDIATRIC) - NS MD DC FALL RISK RISK
For information on Fall & Injury Prevention, visit: [email or web address] OR  [email or web address] OR  https://www.cdc.gov/steadi/patient.html

## 2022-04-13 NOTE — ASU DISCHARGE PLAN (ADULT/PEDIATRIC) - CARE PROVIDER_API CALL
Forrest Webster)  Orthopaedic Sports Medicine; Orthopaedic Surgery  [address]  [place] [address]  Phone: [phone]  Fax: [phone]  Follow Up Time: 1 week

## 2022-04-29 ENCOUNTER — APPOINTMENT (OUTPATIENT)
Dept: ORTHOPEDIC SURGERY | Facility: CLINIC | Age: 22
End: 2022-04-29
Payer: COMMERCIAL

## 2022-04-29 PROCEDURE — 99024 POSTOP FOLLOW-UP VISIT: CPT

## 2022-04-29 NOTE — HISTORY OF PRESENT ILLNESS
[de-identified] : 21 y/o M presented to the clinic for first post op visit. Patient s/p R shoulder arthroscopy with labral repair 04/13/22. Doing well. He has been NWB to RUE in post op abd pillow. Denies wound site drainage. Denies F/C/N/T/W  [de-identified] : Right Upper Extremity \par o Shoulder :\par ¦ Inspection/Palpation : mild diffuse tenderness, no swelling or deformities, portal sites healing well\par ¦ Range of Motion : PASSIVE FLEXION 90 degrees, ACTIVE EXTERNAL ROTATION 0 degrees\par ¦ Strength : not assessed\par ¦ Stability : not assessed\par ¦ Tests/Signs : not assessed\par o Upper Arm : no tenderness, swelling or deformities\par o Muscle Bulk : no atrophy \par o Sensation : sensation intact to light touch \par o Skin : no skin rash or discoloration\par o Vascular Exam : no edema or cyanosis, radial and ulnar pulses normal \par  [de-identified] : Arthroscopic images reviewed with patient\par \par Continue Sling immobilization\par \par Begin physical therapy per protocol\par \par F/U 6 weeks

## 2022-06-09 ENCOUNTER — APPOINTMENT (OUTPATIENT)
Dept: ORTHOPEDIC SURGERY | Facility: CLINIC | Age: 22
End: 2022-06-09
Payer: COMMERCIAL

## 2022-06-09 VITALS — BODY MASS INDEX: 34.65 KG/M2 | HEIGHT: 74 IN | WEIGHT: 270 LBS

## 2022-06-09 DIAGNOSIS — M25.311 OTHER INSTABILITY, RIGHT SHOULDER: ICD-10-CM

## 2022-06-09 DIAGNOSIS — S43.004A UNSPECIFIED DISLOCATION OF RIGHT SHOULDER JOINT, INITIAL ENCOUNTER: ICD-10-CM

## 2022-06-09 PROCEDURE — 99024 POSTOP FOLLOW-UP VISIT: CPT

## 2022-08-04 ENCOUNTER — APPOINTMENT (OUTPATIENT)
Dept: ORTHOPEDIC SURGERY | Facility: CLINIC | Age: 22
End: 2022-08-04

## 2022-08-08 ENCOUNTER — APPOINTMENT (OUTPATIENT)
Dept: FAMILY MEDICINE | Facility: CLINIC | Age: 22
End: 2022-08-08

## 2022-10-28 ENCOUNTER — APPOINTMENT (OUTPATIENT)
Dept: FAMILY MEDICINE | Facility: CLINIC | Age: 22
End: 2022-10-28

## 2022-10-28 VITALS
WEIGHT: 296 LBS | TEMPERATURE: 96.7 F | SYSTOLIC BLOOD PRESSURE: 114 MMHG | DIASTOLIC BLOOD PRESSURE: 80 MMHG | RESPIRATION RATE: 17 BRPM | OXYGEN SATURATION: 97 % | HEIGHT: 74 IN | BODY MASS INDEX: 37.99 KG/M2 | HEART RATE: 100 BPM

## 2022-10-28 DIAGNOSIS — J06.9 ACUTE UPPER RESPIRATORY INFECTION, UNSPECIFIED: ICD-10-CM

## 2022-10-28 PROCEDURE — 99213 OFFICE O/P EST LOW 20 MIN: CPT

## 2022-10-31 PROBLEM — J06.9 ACUTE URI: Status: RESOLVED | Noted: 2022-10-31 | Resolved: 2022-11-30

## 2022-10-31 NOTE — HISTORY OF PRESENT ILLNESS
[FreeTextEntry8] : Patient presents with cough, runny nose, and sore throat  that is slightly improving since this past Monday.\par Does note mild chest congestion. NO wheezing, sob or wheezing. \par HAs taken nyquil at night. \par Denies sick contacts. \par Works at home.

## 2023-05-31 NOTE — ASU PATIENT PROFILE, ADULT - BRADEN SCORE (IF 18 OR LESS ACTIVATE SKIN INJURY RISK INCREASED GUIDELINE), MLM
Spoke with patient's , HIPAA verified, and states he would like patient to go to outpatient therapy because she gets better results.  would like patient to go to [facility], a physical therapist, in [place], [place]. Phone number [phone] Fax [phone]. Informed  will let Dr. Vincenzo Amin know and will fax order to [facility].  verbalized understanding. 20

## 2023-12-02 NOTE — PHYSICAL EXAM
[de-identified] : Constitutional\par o Appearance : well-nourished, well developed, alert, in no acute distress \par Head and Face\par o Head :\par ¦ Inspection : atraumatic, normocephalic\par o Face :\par ¦ Inspection : no visible rash or discoloration\par Respiratory\par o Respiratory Effort: breathing unlabored \par Neurologic\par o Sensation : Normal sensation \par Psychiatric\par o Mood and Affect: mood normal, affect appropriate \par Lymphatic\par o Additional Nodes : No palpable lymph nodes present \par \par Right Upper Extremity\par o Right Shoulder :\par ¦ Inspection/Palpation : no anterior capsular or biceps tenderness to palpation, no swelling or deformities, normal sensation in the axillary patch\par ¦ Range of Motion : full and painless in all planes, tightness with abduction and external rotation, no crepitance, no pain with cross chest maneuvers\par ¦ Strength :  forward elevation, internal rotation, external rotation, external rotation at 90° of abduction, supraspinatus, adduction and abduction, 5/5 in all arcs, biceps/triceps 5/5, excellent  strength\par ¦ Stability : no overt joint instability on provocative testing, no anterior, posterior or inferior instability\par ¦ Tests: Box negative, Neer negative, Justin negative, drop arm test negative, negative apprehension test, negative Load and Shift test, negative Sulcus sign\par \par Gait and Station:\par Gait: gait normal, no significant extremity swelling or lymphedema, good proprioception and balance 199.9

## 2023-12-04 ENCOUNTER — APPOINTMENT (OUTPATIENT)
Dept: FAMILY MEDICINE | Facility: CLINIC | Age: 23
End: 2023-12-04
Payer: COMMERCIAL

## 2023-12-04 VITALS
HEIGHT: 74 IN | DIASTOLIC BLOOD PRESSURE: 70 MMHG | BODY MASS INDEX: 33.5 KG/M2 | SYSTOLIC BLOOD PRESSURE: 115 MMHG | WEIGHT: 261 LBS | RESPIRATION RATE: 20 BRPM | HEART RATE: 76 BPM

## 2023-12-04 DIAGNOSIS — Z00.00 ENCOUNTER FOR GENERAL ADULT MEDICAL EXAMINATION W/OUT ABNORMAL FINDINGS: ICD-10-CM

## 2023-12-04 DIAGNOSIS — E78.5 HYPERLIPIDEMIA, UNSPECIFIED: ICD-10-CM

## 2023-12-04 DIAGNOSIS — J45.909 UNSPECIFIED ASTHMA, UNCOMPLICATED: ICD-10-CM

## 2023-12-04 PROCEDURE — 93000 ELECTROCARDIOGRAM COMPLETE: CPT

## 2023-12-04 PROCEDURE — 99395 PREV VISIT EST AGE 18-39: CPT | Mod: 25

## 2023-12-04 PROCEDURE — 36415 COLL VENOUS BLD VENIPUNCTURE: CPT

## 2023-12-05 LAB
ALBUMIN SERPL ELPH-MCNC: 4.7 G/DL
ALP BLD-CCNC: 65 U/L
ALT SERPL-CCNC: 45 U/L
ANION GAP SERPL CALC-SCNC: 12 MMOL/L
APPEARANCE: CLEAR
AST SERPL-CCNC: 26 U/L
BILIRUB SERPL-MCNC: 0.6 MG/DL
BILIRUBIN URINE: NEGATIVE
BLOOD URINE: NEGATIVE
BUN SERPL-MCNC: 13 MG/DL
CALCIUM SERPL-MCNC: 10.2 MG/DL
CHLORIDE SERPL-SCNC: 102 MMOL/L
CHOLEST SERPL-MCNC: 253 MG/DL
CO2 SERPL-SCNC: 25 MMOL/L
COLOR: YELLOW
CREAT SERPL-MCNC: 1.16 MG/DL
EGFR: 90 ML/MIN/1.73M2
ESTIMATED AVERAGE GLUCOSE: 103 MG/DL
GLUCOSE QUALITATIVE U: NEGATIVE MG/DL
GLUCOSE SERPL-MCNC: 92 MG/DL
HBA1C MFR BLD HPLC: 5.2 %
HCT VFR BLD CALC: 49.8 %
HDLC SERPL-MCNC: 39 MG/DL
HGB BLD-MCNC: 17.1 G/DL
KETONES URINE: NEGATIVE MG/DL
LDLC SERPL CALC-MCNC: 179 MG/DL
LEUKOCYTE ESTERASE URINE: NEGATIVE
MCHC RBC-ENTMCNC: 30.3 PG
MCHC RBC-ENTMCNC: 34.3 GM/DL
MCV RBC AUTO: 88.3 FL
NITRITE URINE: NEGATIVE
NONHDLC SERPL-MCNC: 214 MG/DL
PH URINE: 5.5
PLATELET # BLD AUTO: 317 K/UL
POTASSIUM SERPL-SCNC: 4.3 MMOL/L
PROT SERPL-MCNC: 7.4 G/DL
PROTEIN URINE: NEGATIVE MG/DL
RBC # BLD: 5.64 M/UL
RBC # FLD: 12.5 %
SODIUM SERPL-SCNC: 138 MMOL/L
SPECIFIC GRAVITY URINE: 1.03
T4 FREE SERPL-MCNC: 1.2 NG/DL
TRIGL SERPL-MCNC: 185 MG/DL
TSH SERPL-ACNC: 3.12 UIU/ML
UROBILINOGEN URINE: 0.2 MG/DL
WBC # FLD AUTO: 9.92 K/UL

## 2023-12-12 NOTE — ED ADULT NURSE NOTE - CHIEF COMPLAINT
The patient is a 20y Male complaining of shoulder pain/injury. I, Reyna Alves MD,  performed the initial face to face bedside interview with this patient regarding history of present illness, review of symptoms and relevant past medical, social and family history.  I completed an independent physical examination.  I was the initial provider who evaluated this patient.   I personally saw the patient and performed a substantive portion of the visit including all aspects of the medical decision making.  I have signed out the follow up of any pending tests (i.e. labs, radiological studies) to the JEREMY.  I have communicated the patient’s plan of care and disposition with the JEREMY.  The history, relevant review of systems, past medical and surgical history, medical decision making, and physical examination was documented by the scribe in my presence and I attest to the accuracy of the documentation.

## 2024-03-13 ENCOUNTER — EMERGENCY (EMERGENCY)
Facility: HOSPITAL | Age: 24
LOS: 1 days | Discharge: HOME | End: 2024-03-13
Attending: EMERGENCY MEDICINE | Admitting: EMERGENCY MEDICINE
Payer: COMMERCIAL

## 2024-03-13 VITALS
RESPIRATION RATE: 18 BRPM | TEMPERATURE: 98 F | HEIGHT: 74 IN | SYSTOLIC BLOOD PRESSURE: 122 MMHG | WEIGHT: 270.07 LBS | OXYGEN SATURATION: 98 % | HEART RATE: 83 BPM | DIASTOLIC BLOOD PRESSURE: 76 MMHG

## 2024-03-13 VITALS
OXYGEN SATURATION: 98 % | HEART RATE: 81 BPM | DIASTOLIC BLOOD PRESSURE: 74 MMHG | SYSTOLIC BLOOD PRESSURE: 128 MMHG | RESPIRATION RATE: 17 BRPM

## 2024-03-13 DIAGNOSIS — Z90.89 ACQUIRED ABSENCE OF OTHER ORGANS: Chronic | ICD-10-CM

## 2024-03-13 LAB
ANION GAP SERPL CALC-SCNC: 11 MMOL/L — SIGNIFICANT CHANGE UP (ref 5–17)
BUN SERPL-MCNC: 17 MG/DL — SIGNIFICANT CHANGE UP (ref 7–23)
CALCIUM SERPL-MCNC: 9.6 MG/DL — SIGNIFICANT CHANGE UP (ref 8.4–10.5)
CHLORIDE SERPL-SCNC: 102 MMOL/L — SIGNIFICANT CHANGE UP (ref 96–108)
CO2 SERPL-SCNC: 27 MMOL/L — SIGNIFICANT CHANGE UP (ref 22–31)
CREAT SERPL-MCNC: 1.25 MG/DL — SIGNIFICANT CHANGE UP (ref 0.5–1.3)
EGFR: 82 ML/MIN/1.73M2 — SIGNIFICANT CHANGE UP
GLUCOSE SERPL-MCNC: 94 MG/DL — SIGNIFICANT CHANGE UP (ref 70–99)
HCT VFR BLD CALC: 44.8 % — SIGNIFICANT CHANGE UP (ref 39–50)
HGB BLD-MCNC: 15.4 G/DL — SIGNIFICANT CHANGE UP (ref 13–17)
MCHC RBC-ENTMCNC: 30.1 PG — SIGNIFICANT CHANGE UP (ref 27–34)
MCHC RBC-ENTMCNC: 34.4 GM/DL — SIGNIFICANT CHANGE UP (ref 32–36)
MCV RBC AUTO: 87.5 FL — SIGNIFICANT CHANGE UP (ref 80–100)
NRBC # BLD: 0 /100 WBCS — SIGNIFICANT CHANGE UP (ref 0–0)
OB PNL STL: POSITIVE
PLATELET # BLD AUTO: 350 K/UL — SIGNIFICANT CHANGE UP (ref 150–400)
POTASSIUM SERPL-MCNC: 3.9 MMOL/L — SIGNIFICANT CHANGE UP (ref 3.5–5.3)
POTASSIUM SERPL-SCNC: 3.9 MMOL/L — SIGNIFICANT CHANGE UP (ref 3.5–5.3)
RBC # BLD: 5.12 M/UL — SIGNIFICANT CHANGE UP (ref 4.2–5.8)
RBC # FLD: 12.4 % — SIGNIFICANT CHANGE UP (ref 10.3–14.5)
SODIUM SERPL-SCNC: 140 MMOL/L — SIGNIFICANT CHANGE UP (ref 135–145)
WBC # BLD: 7.02 K/UL — SIGNIFICANT CHANGE UP (ref 3.8–10.5)
WBC # FLD AUTO: 7.02 K/UL — SIGNIFICANT CHANGE UP (ref 3.8–10.5)

## 2024-03-13 PROCEDURE — 82272 OCCULT BLD FECES 1-3 TESTS: CPT

## 2024-03-13 PROCEDURE — 80048 BASIC METABOLIC PNL TOTAL CA: CPT

## 2024-03-13 PROCEDURE — 99284 EMERGENCY DEPT VISIT MOD MDM: CPT

## 2024-03-13 PROCEDURE — 85027 COMPLETE CBC AUTOMATED: CPT

## 2024-03-13 PROCEDURE — 36415 COLL VENOUS BLD VENIPUNCTURE: CPT

## 2024-03-13 RX ORDER — SODIUM CHLORIDE 9 MG/ML
1000 INJECTION INTRAMUSCULAR; INTRAVENOUS; SUBCUTANEOUS ONCE
Refills: 0 | Status: COMPLETED | OUTPATIENT
Start: 2024-03-13 | End: 2024-03-13

## 2024-03-13 RX ADMIN — SODIUM CHLORIDE 1000 MILLILITER(S): 9 INJECTION INTRAMUSCULAR; INTRAVENOUS; SUBCUTANEOUS at 01:50

## 2024-03-13 NOTE — ED ADULT NURSE NOTE - OBJECTIVE STATEMENT
Patient A&Ox4 came in c/o bloody stool for a few days. Patient A&Ox4 came in c/o bloody stool for a few days. Denies CP/ SOB/ Fever/ Chills/ N/V.

## 2024-03-13 NOTE — ED ADULT TRIAGE NOTE - BP NONINVASIVE SYSTOLIC (MM HG)

## 2024-03-13 NOTE — ED PROVIDER NOTE - PROGRESS NOTE DETAILS
Patient's hemoglobin is 15.  His occult is positive but that is expected based on his report.  Patient is hemodynamically stable and can be discharged to follow-up with his gastroenterologist.

## 2024-03-13 NOTE — ED PROVIDER NOTE - PHYSICAL EXAMINATION
Vitals: I have reviewed the patients vital signs  General: nontoxic appearing  HEENT: Atraumatic, normocephalic, airway patent  Eyes: EOMI, tracking appropriately  Neck: no tracheal deviation  Chest/Lungs: no trauma, symmetric chest rise, speaking in complete sentences,  no resp distress  Heart: skin and extremities well perfused, regular rate and rhythm  Neuro: A+Ox3, appears non focal  MSK: no deformities  Skin: no cyanosis, no jaundice   Psych:  Normal mood and affect  Abdomen: Soft nontender nondistended rectal chaperone Eliza Brown stool in the vault no palpable hemorrhoids

## 2024-03-13 NOTE — ED ADULT NURSE NOTE - CHIEF COMPLAINT QUOTE
pt c/o passing blood in urine for couple of days , today is more frequent , lower abdominal pain denies fever/N/V

## 2024-03-13 NOTE — ED ADULT TRIAGE NOTE - CHIEF COMPLAINT QUOTE
pt c/o passing blood in urine for couple of days , today is more frequent , lower abdominal pain denies fever/N/V pt c/o passing blood in stool for couple of days , today is more frequent , lower abdominal pain denies fever/N/V

## 2024-03-13 NOTE — ED PROVIDER NOTE - PATIENT PORTAL LINK FT
You can access the FollowMyHealth Patient Portal offered by [facility] by registering at the following website: [email or web address]. By joining [employer]’s FollowMyHealth portal, you will also be able to view your health information using other applications (apps) compatible with our system.

## 2024-03-13 NOTE — ED PROVIDER NOTE - CLINICAL SUMMARY MEDICAL DECISION MAKING FREE TEXT BOX
24-year-old male with few days of bloody stools.  Differential includes but is not limited to diverticular bleed, hemorrhoid bleed, diverticulosis.  Patient is not bleeding briskly.  Will get a CBC to evaluate his hemoglobin.  Guaiac as well.  If hemoglobin is stable can follow-up outpatient with gastroenterology.

## 2024-03-13 NOTE — ED PROVIDER NOTE - OBJECTIVE STATEMENT
24-year-old male no significant past medical history presenting with a few days of bloody bowel movements.  States that there is blood in the water blood on the paper as well as stool that looks a little darker.  There is no pain with defecation and his stools are not hard.  There is no lightheadedness shortness of breath or weakness.  There is no abdominal pain.  Contrary to the triage note there is no hematuria.

## 2024-03-13 NOTE — ED PROVIDER NOTE - NSFOLLOWUPINSTRUCTIONS_ED_ALL_ED_FT
Make sure to follow-up with your primary care doctor or gastroenterologist.  Return for any lightheadedness shortness of breath worsening bleeding or black stools.    Gastrointestinal Bleeding  Gastrointestinal (GI) bleeding is bleeding somewhere along the digestive tract, between the mouth and the anus. The digestive tract includes the mouth, esophagus, stomach, small intestine, large intestine, and anus. The large intestine is often called the colon.    GI bleeding can be caused by various problems. The severity of these problems can be mild, serious, or life-threatening. If you have GI bleeding, you may find blood in your stools (feces), you may have black stools, or you may vomit blood. You may need to stay in the hospital if there is a lot of bleeding.    What are the causes?  This condition may be caused by:  Inflammation, irritation, or swelling of the esophagus (esophagitis). The esophagus is part of the body that moves food from your mouth to your stomach.  Swollen veins in the rectum (hemorrhoids).  Tears in the anus (anal fissures). The tears are often caused by passing hard stool.  Pouches that form on the colon and may bleed (diverticulosis).  Inflammation in areas with diverticulosis. This is called diverticulitis.This can cause pain, fever, and bloody stools.  Growths (polyps) or cancer. Colon cancer often starts out as precancerous polyps.  Gastritis and ulcers. These may cause bleeding in the upper GI tract, near the stomach.  What increases the risk?  You are more likely to develop this condition if:  You have an infection in your stomach from a type of bacteria called Helicobacter pylori.  You take certain medicines, such as:  NSAIDs.  Aspirin.  Selective serotonin reuptake inhibitors (SSRIs).  Steroids.  Antiplatelet or anticoagulant medicines.  You smoke.  You drink alcohol.  What are the signs or symptoms?  Common symptoms of this condition include:  Bright red blood in your vomit, or vomit that looks like coffee grounds.  Bloody, black, or tarry stools.  Bleeding from the lower GI tract will usually cause red or maroon blood in the stools.  Bleeding from the upper GI tract may cause black, tarry stools that are often stronger smelling than usual.  In certain cases, if the bleeding is fast enough, the stools may be red.  Pain or cramping in the abdomen.  How is this diagnosed?  This condition may be diagnosed based on:  Your medical history and a physical exam.  Various tests, such as:  Blood tests.  Stool tests.  X-rays and other imaging tests.  Esophagogastroduodenoscopy (EGD). In this test, a flexible, lighted tube is used to look at your esophagus, stomach, and small intestine.  Colonoscopy. In this test, a flexible, lighted tube is used to look at your colon.  How is this treated?  Treatment for this condition depends on the cause of the bleeding. For example:  For bleeding from the esophagus, stomach, small intestine, or colon, the health care provider may do a procedure to stop bleeding during your EGD or colonoscopy.  Inflammation or infection of the colon can be treated with medicines.  Certain rectal problems can be treated with creams, suppositories, or warm baths.  Medicines may be given to reduce acid in your stomach.  Surgery is sometimes done.  Blood transfusions are sometimes needed if a lot of blood has been lost.  If there is a lot of bleeding, you will need to stay in the hospital for observation. If bleeding is mild, you may be allowed to go home.    Follow these instructions at home:  Foods with fiber, including fruits, vegetables, greens, seeds, and grain.  Take over-the-counter and prescription medicines only as told by your health care provider.  Eat foods that are high in fiber, such as beans, whole grains, and fresh fruits and vegetables. This will help to keep your stools soft. Eating 1–3 prunes each day works well for many people.  Drink enough fluid to keep your urine pale yellow.  Keep all follow-up visits. This is important.  Contact a health care provider if:  Your symptoms do not improve with treatment.  Get help right away if:  Your bleeding does not stop.  You feel light-headed or you faint.  You feel weak.  You have severe cramps in your back or abdomen.  You pass large blood clots in your stool.  Your symptoms are getting worse.  You have chest pain or fast heartbeats.  These symptoms may be an emergency. Get help right away. Call 911.  Do not wait to see if the symptoms will go away.  Do not drive yourself to the hospital.  Summary  Gastrointestinal (GI) bleeding is bleeding somewhere along the digestive tract, between the mouth and anus. GI bleeding can be caused by various problems.  Treatment for this condition depends on the cause of the bleeding.  Take over-the-counter and prescription medicines only as told by your health care provider.  Get help right away if your bleeding increases, your symptoms are getting worse, or you have new symptoms.  Keep all follow-up visits. This is important.

## 2024-09-05 ENCOUNTER — NON-APPOINTMENT (OUTPATIENT)
Age: 24
End: 2024-09-05

## 2024-09-06 ENCOUNTER — APPOINTMENT (OUTPATIENT)
Dept: FAMILY MEDICINE | Facility: CLINIC | Age: 24
End: 2024-09-06
Payer: COMMERCIAL

## 2024-09-06 VITALS
HEIGHT: 74 IN | HEART RATE: 103 BPM | OXYGEN SATURATION: 97 % | WEIGHT: 266 LBS | RESPIRATION RATE: 16 BRPM | TEMPERATURE: 97.2 F | BODY MASS INDEX: 34.14 KG/M2 | DIASTOLIC BLOOD PRESSURE: 86 MMHG | SYSTOLIC BLOOD PRESSURE: 112 MMHG

## 2024-09-06 DIAGNOSIS — Z87.2 PERSONAL HISTORY OF DISEASES OF THE SKIN AND SUBCUTANEOUS TISSUE: ICD-10-CM

## 2024-09-06 DIAGNOSIS — S43.004A UNSPECIFIED DISLOCATION OF RIGHT SHOULDER JOINT, INITIAL ENCOUNTER: ICD-10-CM

## 2024-09-06 DIAGNOSIS — Z86.59 PERSONAL HISTORY OF OTHER MENTAL AND BEHAVIORAL DISORDERS: ICD-10-CM

## 2024-09-06 DIAGNOSIS — Z00.129 ENCOUNTER FOR ROUTINE CHILD HEALTH EXAMINATION W/OUT ABNORMAL FINDINGS: ICD-10-CM

## 2024-09-06 DIAGNOSIS — Z23 ENCOUNTER FOR IMMUNIZATION: ICD-10-CM

## 2024-09-06 DIAGNOSIS — K52.9 NONINFECTIVE GASTROENTERITIS AND COLITIS, UNSPECIFIED: ICD-10-CM

## 2024-09-06 DIAGNOSIS — E78.5 HYPERLIPIDEMIA, UNSPECIFIED: ICD-10-CM

## 2024-09-06 DIAGNOSIS — T14.8XXA OTHER INJURY OF UNSPECIFIED BODY REGION, INITIAL ENCOUNTER: ICD-10-CM

## 2024-09-06 DIAGNOSIS — M25.311 OTHER INSTABILITY, RIGHT SHOULDER: ICD-10-CM

## 2024-09-06 DIAGNOSIS — S99.922A UNSPECIFIED INJURY OF LEFT FOOT, INITIAL ENCOUNTER: ICD-10-CM

## 2024-09-06 DIAGNOSIS — J35.8 OTHER CHRONIC DISEASES OF TONSILS AND ADENOIDS: ICD-10-CM

## 2024-09-06 DIAGNOSIS — Z87.09 PERSONAL HISTORY OF OTHER DISEASES OF THE RESPIRATORY SYSTEM: ICD-10-CM

## 2024-09-06 DIAGNOSIS — J06.9 ACUTE UPPER RESPIRATORY INFECTION, UNSPECIFIED: ICD-10-CM

## 2024-09-06 DIAGNOSIS — R07.9 CHEST PAIN, UNSPECIFIED: ICD-10-CM

## 2024-09-06 DIAGNOSIS — S99.912A UNSPECIFIED INJURY OF LEFT ANKLE, INITIAL ENCOUNTER: ICD-10-CM

## 2024-09-06 DIAGNOSIS — Z87.898 PERSONAL HISTORY OF OTHER SPECIFIED CONDITIONS: ICD-10-CM

## 2024-09-06 PROCEDURE — 99214 OFFICE O/P EST MOD 30 MIN: CPT

## 2024-09-06 RX ORDER — MESALAMINE 1.2 G/1
1.2 TABLET, DELAYED RELEASE ORAL
Refills: 0 | Status: ACTIVE | COMMUNITY

## 2024-09-06 NOTE — ASSESSMENT
[FreeTextEntry1] : low chol. diet.  pt. will have blood work done at [facility].  Colitis stable.

## 2024-09-06 NOTE — HISTORY OF PRESENT ILLNESS
[FreeTextEntry1] : see HPI [de-identified] : He has HLD, diet controlled. He exercises regularly. He is AAOX3,NAD. No chest pain, SOB, fever, chills, cough. He is applying for job in [employer]. He is under care of Colitis , dr. Llanos and he is taking Mesalamine.

## 2024-09-06 NOTE — HEALTH RISK ASSESSMENT
[No] : In the past 12 months have you used drugs other than those required for medical reasons? No [No falls in past year] : Patient reported no falls in the past year [0] : 2) Feeling down, depressed, or hopeless: Not at all (0) [PHQ-2 Negative - No further assessment needed] : PHQ-2 Negative - No further assessment needed [With Patient/Caregiver] : , with patient/caregiver [Designated Healthcare Proxy] : Designated healthcare proxy [Name: ___] : Health Care Proxy's Name: [unfilled]  [Relationship: ___] : Relationship: [unfilled] [Never] : Never [de-identified] : active [de-identified] : regular [[id number]] : 0 [AdvancecareDate] : 09/24

## 2024-09-12 ENCOUNTER — APPOINTMENT (OUTPATIENT)
Dept: ORTHOPEDIC SURGERY | Facility: CLINIC | Age: 24
End: 2024-09-12

## 2024-10-07 NOTE — PHYSICAL EXAM
Orders:    Urinalysis & Reflex Microscopy With Culture If Indicated; Future    Glycohemoglobin; Future    Comprehensive Metabolic Panel; Future    CBC with Automated Differential; Future    Thyroid Stimulating Hormone; Future    Free T4; Future    Lipid Panel Without Reflex; Future     [de-identified] : Right Upper Extremity\par o Shoulder :\par ¦ Inspection/Palpation : tenderness over the greater tuberosity, no acromioclavicular joint tenderness,  tenderness anterior and posterior glenohumeral joint,no swelling, no deformities\par ¦ Range of Motion : ACTIVE FORWARD ELEVATION: Measured at 150 degrees, ACTIVE EXTERNAL ROTATION: Measured at 55 degrees, ACTIVE INTERNAL ROTATION: Measured at L1\par ¦ Strength : external rotation 5/5, internal rotation 5/5, supraspinatus 5/5 with pain. \par ¦ Stability : Apprehension (+), Surprise test (+)\par ¦ Tests/Signs : Neer (+), Box (+), Burgess (+) \par o Upper Arm : no tenderness, no swelling, no deformities\par o Muscle Bulk : no atrophy\par o Sensation : sensation intact to light touch\par o Skin : no skin rash or discoloration\par o Vascular Exam : no edema, no cyanosis, radial and ulnar pulses normal\par \par Left Upper Extremity\par o Shoulder :\par ¦ Inspection/Palpation :  no tenderness over the greater tuberosity, no acromioclavicular joint tenderness, no tenderness anterior and posterior glenohumeral joint,no swelling, no deformities\par ¦ Range of Motion : ACTIVE FORWARD ELEVATION: Measured at 150 degrees, ACTIVE EXTERNAL ROTATION: Measured at 75 degrees, ACTIVE INTERNAL ROTATION: Measured at T8\par ¦ Strength : external rotation 5/5, internal rotation 5/5, supraspinatus 5/5\par ¦ Stability : no joint instability on provocative testing\par ¦ Tests/Signs : Neer (-), Box (-)\par o Upper Arm : no tenderness, no swelling, no deformities\par o Muscle Bulk : no atrophy\par o Sensation : sensation intact to light touch\par o Skin : no skin rash or discoloration\par o Vascular Exam : no edema, no cyanosis, radial and ulnar pulses normal  [de-identified] : Patient comes to today's visit with outside imaging already performed. I reviewed the images in detail with the patient and discussed the findings as highlighted below.\par \par \par EXAM: XR SHOULDER COMP MIN 2V RT\par EXAM: XR SHOULDER COMP MIN 2V RT\par \par PROCEDURE DATE: 10/21/2021\par \par \par INTERPRETATION: CLINICAL STATEMENT: Pain.\par \par TECHNIQUE: AP view right shoulder pre and post reduction\par \par COMPARISON: 1/30/2021\par \par FINDINGS:\par Anterior dislocation noted. No gross acute fracture\par \par The acromioclavicular joint is intact.\par \par Postreduction film demonstrates successful reduction of anterior dislocation\par \par IMPRESSION:\par Findings as described above\par \par --- End of Report ---\par \par \par \par X-rays of the RIGHT shoulder obtained at [facility] on 1/30/2021:\par o AP and attempted Y views revealed an anterior inferior dislocation of the right shoulder. Post-reduction views showed a reduced shoulder without any evident fracture.

## 2025-03-27 ENCOUNTER — APPOINTMENT (OUTPATIENT)
Dept: FAMILY MEDICINE | Facility: CLINIC | Age: 25
End: 2025-03-27
Payer: COMMERCIAL

## 2025-03-27 VITALS — DIASTOLIC BLOOD PRESSURE: 70 MMHG | SYSTOLIC BLOOD PRESSURE: 122 MMHG | HEART RATE: 76 BPM | RESPIRATION RATE: 20 BRPM

## 2025-03-27 DIAGNOSIS — J01.90 ACUTE SINUSITIS, UNSPECIFIED: ICD-10-CM

## 2025-03-27 PROCEDURE — 99213 OFFICE O/P EST LOW 20 MIN: CPT

## 2025-03-27 RX ORDER — METHYLPREDNISOLONE 4 MG/1
4 TABLET ORAL
Qty: 1 | Refills: 0 | Status: ACTIVE | COMMUNITY
Start: 2025-03-27 | End: 1900-01-01

## 2025-03-27 RX ORDER — DOXYCYCLINE HYCLATE 100 MG/1
100 CAPSULE ORAL TWICE DAILY
Qty: 14 | Refills: 0 | Status: ACTIVE | COMMUNITY
Start: 2025-03-27 | End: 1900-01-01
